# Patient Record
Sex: MALE | Race: WHITE | Employment: OTHER | URBAN - METROPOLITAN AREA
[De-identification: names, ages, dates, MRNs, and addresses within clinical notes are randomized per-mention and may not be internally consistent; named-entity substitution may affect disease eponyms.]

---

## 2017-08-15 ENCOUNTER — HOSPITAL ENCOUNTER (OUTPATIENT)
Dept: CT IMAGING | Age: 62
Discharge: HOME | End: 2017-08-15
Attending: FAMILY MEDICINE
Payer: SELF-PAY

## 2017-08-15 DIAGNOSIS — Z13.6 SCREENING FOR HEART DISEASE: ICD-10-CM

## 2017-08-15 PROCEDURE — 75571 CT HRT W/O DYE W/CA TEST: CPT

## 2017-08-15 NOTE — CARDIO/PULMONARY
Cardiac Rehab: Spoke with patient about Calcium Scoring results (320). Discussed significance of results, and CAD risk factors. Pt reported that he was being screened because PCP recommended it at annual checkup. Has been having intermittent chest pains not associated with activity. Deep substernal pain is sharp and comes on suddenly, and goes away quickly on its own. Encouraged pt to talk to MD about this discomfort. He is on meds for DM, HTN and high cholesterol. Denied smoking. Pt follows ADA diet. He does not exercise. Cautioned pt not to begin a new exercise program without being cleared by MD. Pt reported someone from his PCP office called and gave him CA score. Also told him that cardiology office would be contacting him to schedule an appt for f/u. Pt aware he needs to call 911 for persistent chest pain. Pt verbalized understanding and all questions were answered. Copy of report sent to patient, with handout on CAD.

## 2023-02-14 ENCOUNTER — HOSPITAL ENCOUNTER (EMERGENCY)
Age: 68
Discharge: HOME | End: 2023-02-14
Attending: EMERGENCY MEDICINE
Payer: MEDICARE

## 2023-02-14 ENCOUNTER — APPOINTMENT (OUTPATIENT)
Dept: GENERAL RADIOLOGY | Age: 68
End: 2023-02-14
Attending: PHYSICIAN ASSISTANT
Payer: MEDICARE

## 2023-02-14 VITALS
SYSTOLIC BLOOD PRESSURE: 134 MMHG | RESPIRATION RATE: 18 BRPM | HEIGHT: 71 IN | TEMPERATURE: 98 F | WEIGHT: 248 LBS | BODY MASS INDEX: 34.72 KG/M2 | HEART RATE: 53 BPM | DIASTOLIC BLOOD PRESSURE: 79 MMHG | OXYGEN SATURATION: 97 %

## 2023-02-14 DIAGNOSIS — S90.32XA CONTUSION OF LEFT FOOT, INITIAL ENCOUNTER: Primary | ICD-10-CM

## 2023-02-14 DIAGNOSIS — S90.812A ABRASION OF LEFT FOOT, INITIAL ENCOUNTER: ICD-10-CM

## 2023-02-14 DIAGNOSIS — S90.32XA TRAUMATIC HEMATOMA OF LEFT FOOT, INITIAL ENCOUNTER: ICD-10-CM

## 2023-02-14 DIAGNOSIS — Z23 NEED FOR TETANUS BOOSTER: ICD-10-CM

## 2023-02-14 PROCEDURE — 74011250637 HC RX REV CODE- 250/637: Performed by: PHYSICIAN ASSISTANT

## 2023-02-14 PROCEDURE — 99284 EMERGENCY DEPT VISIT MOD MDM: CPT

## 2023-02-14 PROCEDURE — 73630 X-RAY EXAM OF FOOT: CPT

## 2023-02-14 PROCEDURE — 90471 IMMUNIZATION ADMIN: CPT

## 2023-02-14 PROCEDURE — 74011000250 HC RX REV CODE- 250: Performed by: PHYSICIAN ASSISTANT

## 2023-02-14 PROCEDURE — 90714 TD VACC NO PRESV 7 YRS+ IM: CPT | Performed by: PHYSICIAN ASSISTANT

## 2023-02-14 PROCEDURE — 74011250636 HC RX REV CODE- 250/636: Performed by: PHYSICIAN ASSISTANT

## 2023-02-14 RX ORDER — IBUPROFEN 600 MG/1
600 TABLET ORAL
Status: COMPLETED | OUTPATIENT
Start: 2023-02-14 | End: 2023-02-14

## 2023-02-14 RX ORDER — BACITRACIN 500 UNIT/G
1 PACKET (EA) TOPICAL
Status: COMPLETED | OUTPATIENT
Start: 2023-02-14 | End: 2023-02-14

## 2023-02-14 RX ADMIN — CLOSTRIDIUM TETANI TOXOID ANTIGEN (FORMALDEHYDE INACTIVATED) AND CORYNEBACTERIUM DIPHTHERIAE TOXOID ANTIGEN (FORMALDEHYDE INACTIVATED) 0.5 ML: 5; 2 INJECTION, SUSPENSION INTRAMUSCULAR at 18:51

## 2023-02-14 RX ADMIN — IBUPROFEN 600 MG: 600 TABLET, FILM COATED ORAL at 18:51

## 2023-02-14 RX ADMIN — Medication 1 PACKET: at 18:51

## 2023-02-14 NOTE — ED PROVIDER NOTES
69yo male with PMH of T2DM, hypercholesterolemia, HTN presents ambulatory c/o L mid-foot pain, swelling, bruising which began 30 min PTA after he accidentally dropped his tombstone which was sitting on the counter. He was wearing tennis shoes. No bleeding, numbness/tingling or weakness. He has been ambulating with some pain. Takes no anticoagulant. Past Medical History:   Diagnosis Date    Diabetes ([facility] [address]     Hypercholesteremia     Hypertension        Past Surgical History:   Procedure Laterality Date    HX CARPAL TUNNEL RELEASE Right     HX CERVICAL FUSION  1999    C-3, C-4, C-5    HX CHOLECYSTECTOMY      HX HERNIA REPAIR Bilateral 1996    HX KNEE REPLACEMENT Right 2016    HX KNEE REPLACEMENT Left 2014    HX LUMBAR DISKECTOMY      HX MENISCUS REPAIR Right     HX VASECTOMY  1997         No family history on file. Social History     Socioeconomic History    Marital status:      Spouse name: Not on file    Number of children: Not on file    Years of education: Not on file    Highest education level: Not on file   Occupational History    Not on file   Tobacco Use    Smoking status: Never    Smokeless tobacco: Not on file   Substance and Sexual Activity    Alcohol use: No    Drug use: No    Sexual activity: Not on file   Other Topics Concern    Not on file   Social History Narrative    Not on file     Social Determinants of Health     Financial Resource Strain: Not on file   Food Insecurity: Not on file   Transportation Needs: Not on file   Physical Activity: Not on file   Stress: Not on file   Social Connections: Not on file   Intimate Partner Violence: Not on file   Housing Stability: Not on file         ALLERGIES: Patient has no known allergies. Review of Systems   Constitutional: Negative. Negative for activity change, chills, fatigue and unexpected weight change. HENT:  Negative for trouble swallowing. Respiratory:  Negative for cough, chest tightness, shortness of breath and wheezing. Cardiovascular: Negative. Negative for chest pain and palpitations. Gastrointestinal: Negative. Negative for abdominal pain, diarrhea, nausea and vomiting. Genitourinary: Negative. Negative for dysuria, flank pain, frequency and hematuria. Musculoskeletal:  Positive for arthralgias and joint swelling. Negative for back pain, neck pain and neck stiffness. Skin: Negative. Negative for color change and rash. Neurological: Negative. Negative for dizziness, numbness and headaches. All other systems reviewed and are negative. Vitals:    02/14/23 1801 02/14/23 1911   BP: (!) 140/81 134/79   Pulse: 76 (!) 53   Resp: 18 18   Temp: 97.8 °F (36.6 °C) 98 °F (36.7 °C)   SpO2: 97% 97%   Weight: 112.5 kg (248 lb)    Height: 5' 11\" (1.803 m)             Physical Exam  Vitals and nursing note reviewed. HENT:      Head: Normocephalic and atraumatic. Cardiovascular:      Pulses: Normal pulses. Pulmonary:      Effort: Pulmonary effort is normal.   Musculoskeletal:         General: Swelling and tenderness present. Normal range of motion. Cervical back: Normal range of motion. Comments: L foot- raised hematoma to midfoot with moderate swelling and superficial abrasion; no bleeding. DP and PT pulses 2+. NVI throughout. Capp refill brisk. Ankle- atraumatic, FROM. Skin:     General: Skin is warm. Neurological:      Mental Status: He is alert. Medical Decision Making    Ddx: frx, contusion, sprain, hematoma    Amount and/or Complexity of Data Reviewed  Radiology: ordered. Risk  OTC drugs. Prescription drug management. Procedures    No fracture, +hematoma with superficial abrasion. R.I.C.E, wound care discussed and will place ace wrap and post-op shoe. Does not require crutches. Is ambulating well. Orthofoot follow-up or pcp follow-up discussed and return precautions discussed.     DISCHARGE NOTE:  The patient has been re-evaluated and feeling much better and are stable for discharge. All available radiology and laboratory results have been reviewed with patient and/or available family. Patient and/or family verbally conveyed their understanding and agreement of the patient's signs, symptoms, diagnosis, treatment and prognosis and additionally agree to follow-up as recommended in the discharge instructions or to return to the Emergency Department should their condition change or worsen prior to their follow-up appointment. All questions have been answered and patient and/or available family express understanding. MEDICATIONS GIVEN:  Medications   tetanus-diphtheria toxids-td 5-2 Lf unit/0.5 mL injection 0.5 mL (0.5 mL IntraMUSCular Given 2/14/23 1851)   ibuprofen (MOTRIN) tablet 600 mg (600 mg Oral Given 2/14/23 1851)   bacitracin 500 unit/gram packet 1 Packet (1 Packet Topical Given 2/14/23 1851)       IMPRESSION:  1. Contusion of left foot, initial encounter    2. Traumatic hematoma of left foot, initial encounter    3. Need for tetanus booster    4. Abrasion of left foot, initial encounter        PLAN:  Follow-up Information       Follow up With Specialties Details Why Contact Info    Deepthi Mccullough MD [facility] Schedule an appointment as soon as possible for a visit  As needed [address] [facility]  [phone]      Josy Ascencio MD Orthopedic Surgery Schedule an appointment as soon as possible for a visit  orthopedic specialist if symptoms do not improve or worsen.  [address]   [address] [facility]  [phone]            Discharge Medication List as of 2/14/2023  7:02 PM

## 2023-02-14 NOTE — ED TRIAGE NOTES
Patient ambulatory to triage with c/o left foot pain. States 30 minutes prior to arrival a gravestone fell on his foot. States he was tilting it to look at the other side, and it slipped, landing on his foot. States pain 8/10. CHAD Arellano in triage assessing patient.

## 2023-02-15 NOTE — ED NOTES
Pt given discharge instructions, patient education, 0 prescriptions and follow up information. Pt verbalizes understanding. All questions answered. Pt discharged to home in private vehicle, ambulatory. Pt A&Ox4, RA, pain controlled.

## 2023-02-24 ENCOUNTER — APPOINTMENT (OUTPATIENT)
Dept: GENERAL RADIOLOGY | Age: 68
End: 2023-02-24
Attending: FAMILY MEDICINE
Payer: MEDICARE

## 2023-02-24 ENCOUNTER — HOSPITAL ENCOUNTER (EMERGENCY)
Age: 68
Discharge: HOME | End: 2023-02-24
Attending: STUDENT IN AN ORGANIZED HEALTH CARE EDUCATION/TRAINING PROGRAM
Payer: MEDICARE

## 2023-02-24 VITALS
DIASTOLIC BLOOD PRESSURE: 84 MMHG | TEMPERATURE: 98.4 F | SYSTOLIC BLOOD PRESSURE: 168 MMHG | OXYGEN SATURATION: 98 % | HEART RATE: 66 BPM | BODY MASS INDEX: 34.72 KG/M2 | RESPIRATION RATE: 18 BRPM | HEIGHT: 71 IN | WEIGHT: 248 LBS

## 2023-02-24 DIAGNOSIS — L03.90 CELLULITIS, UNSPECIFIED CELLULITIS SITE: Primary | ICD-10-CM

## 2023-02-24 LAB
ALBUMIN SERPL-MCNC: 4 G/DL (ref 3.5–5.2)
ALBUMIN/GLOB SERPL: 1.6 (ref 1.1–2.2)
ALP SERPL-CCNC: 67 U/L (ref 40–129)
ALT SERPL-CCNC: 20 U/L (ref 10–50)
ANION GAP SERPL CALC-SCNC: 12 MMOL/L (ref 5–15)
AST SERPL-CCNC: 18 U/L (ref 10–50)
BASOPHILS # BLD: 0.1 K/UL (ref 0–1)
BASOPHILS NFR BLD: 1 % (ref 0–1)
BILIRUB SERPL-MCNC: 1 MG/DL (ref 0.2–1)
BUN SERPL-MCNC: 14 MG/DL (ref 8–23)
BUN/CREAT SERPL: 19 (ref 12–20)
CALCIUM SERPL-MCNC: 8.8 MG/DL (ref 8.8–10.2)
CHLORIDE SERPL-SCNC: 102 MMOL/L (ref 98–107)
CO2 SERPL-SCNC: 25 MMOL/L (ref 22–29)
CREAT SERPL-MCNC: 0.73 MG/DL (ref 0.7–1.2)
CRP SERPL-MCNC: 0.46 MG/DL
DIFFERENTIAL METHOD BLD: NORMAL
EOSINOPHIL # BLD: 0.4 K/UL (ref 0–0.4)
EOSINOPHIL NFR BLD: 6 % (ref 0–7)
ERYTHROCYTE [DISTWIDTH] IN BLOOD BY AUTOMATED COUNT: 12.1 % (ref 11.5–14.5)
ERYTHROCYTE [SEDIMENTATION RATE] IN BLOOD: 9 MM/HR
GLOBULIN SER CALC-MCNC: 2.5 G/DL (ref 2–4)
GLUCOSE SERPL-MCNC: 297 MG/DL (ref 65–100)
HCT VFR BLD AUTO: 38.2 % (ref 36.6–50.3)
HGB BLD-MCNC: 13.7 G/DL (ref 12.1–17)
IMM GRANULOCYTES # BLD AUTO: 0 K/UL (ref 0–0.04)
IMM GRANULOCYTES NFR BLD AUTO: 0 % (ref 0–0.5)
LYMPHOCYTES # BLD: 2.2 K/UL (ref 0.8–3.5)
LYMPHOCYTES NFR BLD: 36 % (ref 12–49)
MCH RBC QN AUTO: 31.1 PG (ref 26–34)
MCHC RBC AUTO-ENTMCNC: 35.9 G/DL (ref 30–36.5)
MCV RBC AUTO: 86.6 FL (ref 80–99)
MONOCYTES # BLD: 0.3 K/UL (ref 0–1)
MONOCYTES NFR BLD: 6 % (ref 5–13)
NEUTS SEG # BLD: 3.1 K/UL (ref 1.8–8)
NEUTS SEG NFR BLD: 51 % (ref 32–75)
NRBC # BLD: 0 K/UL (ref 0–0.01)
NRBC BLD-RTO: 0 PER 100 WBC
PLATELET # BLD AUTO: 152 K/UL (ref 150–400)
PMV BLD AUTO: 10.2 FL (ref 8.9–12.9)
POTASSIUM SERPL-SCNC: 4 MMOL/L (ref 3.5–5.1)
PROT SERPL-MCNC: 6.5 G/DL (ref 6.4–8.3)
RBC # BLD AUTO: 4.41 M/UL (ref 4.1–5.7)
SODIUM SERPL-SCNC: 139 MMOL/L (ref 136–145)
WBC # BLD AUTO: 6.1 K/UL (ref 4.1–11.1)

## 2023-02-24 PROCEDURE — 73630 X-RAY EXAM OF FOOT: CPT

## 2023-02-24 PROCEDURE — 86140 C-REACTIVE PROTEIN: CPT

## 2023-02-24 PROCEDURE — 36415 COLL VENOUS BLD VENIPUNCTURE: CPT

## 2023-02-24 PROCEDURE — 99284 EMERGENCY DEPT VISIT MOD MDM: CPT

## 2023-02-24 PROCEDURE — 85025 COMPLETE CBC W/AUTO DIFF WBC: CPT

## 2023-02-24 PROCEDURE — 80053 COMPREHEN METABOLIC PANEL: CPT

## 2023-02-24 PROCEDURE — 85652 RBC SED RATE AUTOMATED: CPT

## 2023-02-24 RX ORDER — DOXYCYCLINE HYCLATE 100 MG
100 TABLET ORAL 2 TIMES DAILY
Qty: 14 TABLET | Refills: 0 | Status: SHIPPED | OUTPATIENT
Start: 2023-02-24 | End: 2023-03-03

## 2023-02-24 NOTE — DISCHARGE INSTRUCTIONS
Thank you for allowing us to provide you with medical care today. We realize that you have many choices for your emergency care needs. We thank you for choosing [facility]. Please choose us in the future for any continued health care needs. The exam and treatment you received in the emergency department were for an emergent problem and are not intended as complete care. It is important that you follow-up with a doctor. If your symptoms worsen or you do not improve you should return to the emergency department. We are available 24 hours a day. Please make an appointment with your health care provider for follow-up of your emergency department visit. Take this sheet with you when you go to your follow-up visit.

## 2023-02-24 NOTE — ED PROVIDER NOTES
Patient is a 49-year-old male with past medical history of type 2 diabetes, hypertension, hyperlipidemia presenting for evaluation of foot injury. Was seen here on 2/16 when a heavy tombstone weighing approximately 300 pounds fell on his foot. He had a normal x-ray at that time. He has been Ace wrapping his foot and using a postoperative shoe with great relief of symptoms. His pain has been well controlled. Yesterday evening, he noticed that his foot seemed slightly more swollen and erythematous in nature. He was worried that he could be developing an infection. He was unable to get into his PCP for another week or so. He denies any systemic symptoms of infection such as fever or chills. No other complaints. Past Medical History:   Diagnosis Date    Diabetes ([facility] [address]     Hypercholesteremia     Hypertension        Past Surgical History:   Procedure Laterality Date    HX CARPAL TUNNEL RELEASE Right     HX CERVICAL FUSION  1999    C-3, C-4, C-5    HX CHOLECYSTECTOMY      HX HERNIA REPAIR Bilateral 1996    HX KNEE REPLACEMENT Right 2016    HX KNEE REPLACEMENT Left 2014    HX LUMBAR DISKECTOMY      HX MENISCUS REPAIR Right     HX VASECTOMY  1997         No family history on file.     Social History     Socioeconomic History    Marital status:      Spouse name: Not on file    Number of children: Not on file    Years of education: Not on file    Highest education level: Not on file   Occupational History    Not on file   Tobacco Use    Smoking status: Never    Smokeless tobacco: Not on file   Substance and Sexual Activity    Alcohol use: No    Drug use: No    Sexual activity: Not on file   Other Topics Concern    Not on file   Social History Narrative    Not on file     Social Determinants of Health     Financial Resource Strain: Not on file   Food Insecurity: Not on file   Transportation Needs: Not on file   Physical Activity: Not on file   Stress: Not on file   Social Connections: Not on file Intimate Partner Violence: Not on file   Housing Stability: Not on file         ALLERGIES: Patient has no known allergies. Review of Systems   Constitutional:  Negative for fever and unexpected weight change. HENT:  Negative for congestion. Eyes:  Negative for visual disturbance. Respiratory:  Negative for cough, chest tightness and shortness of breath. Cardiovascular:  Negative for chest pain and palpitations. Gastrointestinal:  Negative for abdominal pain, diarrhea, nausea and vomiting. Endocrine: Negative for polyuria. Genitourinary:  Negative for dysuria and flank pain. Musculoskeletal:  Positive for arthralgias and joint swelling. Negative for back pain. Skin:  Positive for rash. Negative for color change. Allergic/Immunologic: Negative for immunocompromised state. Neurological:  Negative for dizziness and headaches. Hematological:  Negative for adenopathy. Psychiatric/Behavioral:  Negative for agitation. Vitals:    02/24/23 1330   BP: (!) 168/84   Pulse: 66   Resp: 18   Temp: 98.4 °F (36.9 °C)   SpO2: 98%   Weight: 112.5 kg (248 lb)   Height: 5' 11\" (1.803 m)            Physical Exam  Vitals and nursing note reviewed. Constitutional:       General: He is not in acute distress. Appearance: Normal appearance. He is normal weight. HENT:      Head: Atraumatic. Eyes:      Conjunctiva/sclera: Conjunctivae normal.      Pupils: Pupils are equal, round, and reactive to light. Cardiovascular:      Rate and Rhythm: Normal rate. Pulmonary:      Effort: Pulmonary effort is normal. No respiratory distress. Abdominal:      General: Abdomen is flat. Musculoskeletal:         General: Normal range of motion. Cervical back: Neck supple. Comments: Left foot soft tissue swelling present. Normal DP pulse. Area of erythema and heat present. Skin:     General: Skin is warm and dry. Capillary Refill: Capillary refill takes less than 2 seconds.    Neurological: General: No focal deficit present. Mental Status: He is alert and oriented to person, place, and time. Mental status is at baseline. Psychiatric:         Mood and Affect: Mood normal.         Behavior: Behavior normal.        Medical Decision Making  Presenting with left foot erythema, heat, soft tissue swelling. Injury on 2/16. Physical exam concerning for development of cellulitis. Plan to obtain basic labs including CBC, inflammatory markers. We will repeat x-ray of foot. 1505 -Labs without elevation in inflammatory markers or WBC. X-ray of foot interpreted by myself and radiologist with no bony abnormality. Advised patient to continue concussion wrap and postoperative shoe until he follows up with orthopedics and/or his primary care. Will cover patient for possible developing cellulitic infection with doxycycline. Discussed strict return precautions. Discussed my clinical impression(s), any labs and/or radiology results with the patient. I answered any questions and addressed any concerns. Discussed the importance of following up with their primary care physician and/or specialist(s). Discussed signs or symptoms that would warrant return back to the ER for further evaluation. The patient is agreeable with discharge. Amount and/or Complexity of Data Reviewed  Labs: ordered. Decision-making details documented in ED Course. Radiology: ordered and independent interpretation performed. Decision-making details documented in ED Course. Risk  Prescription drug management.            Procedures

## 2023-02-24 NOTE — ED TRIAGE NOTES
Pt arrives ambulatory w/ post op shoe to L foot w/ c/c of L foot swelling/skin changes after 300lb chelsi fell on it 2/14/23. Pt was seen in ED the same day & reports he did not have any fx. Pt reports blistering to skin & erythema. Pt has ACE wrap & post op shoe in place.

## 2023-05-03 ENCOUNTER — APPOINTMENT (OUTPATIENT)
Dept: CT IMAGING | Age: 68
End: 2023-05-03
Attending: STUDENT IN AN ORGANIZED HEALTH CARE EDUCATION/TRAINING PROGRAM
Payer: MEDICARE

## 2023-05-03 ENCOUNTER — HOSPITAL ENCOUNTER (EMERGENCY)
Age: 68
Discharge: HOME | End: 2023-05-03
Attending: EMERGENCY MEDICINE
Payer: MEDICARE

## 2023-05-03 VITALS
SYSTOLIC BLOOD PRESSURE: 143 MMHG | OXYGEN SATURATION: 98 % | WEIGHT: 230 LBS | BODY MASS INDEX: 32.2 KG/M2 | DIASTOLIC BLOOD PRESSURE: 72 MMHG | TEMPERATURE: 98 F | HEART RATE: 65 BPM | RESPIRATION RATE: 15 BRPM | HEIGHT: 71 IN

## 2023-05-03 DIAGNOSIS — M54.2 NECK PAIN: Primary | ICD-10-CM

## 2023-05-03 PROCEDURE — 72125 CT NECK SPINE W/O DYE: CPT

## 2023-05-03 PROCEDURE — 74011000250 HC RX REV CODE- 250: Performed by: STUDENT IN AN ORGANIZED HEALTH CARE EDUCATION/TRAINING PROGRAM

## 2023-05-03 PROCEDURE — 74011250637 HC RX REV CODE- 250/637: Performed by: STUDENT IN AN ORGANIZED HEALTH CARE EDUCATION/TRAINING PROGRAM

## 2023-05-03 PROCEDURE — 99284 EMERGENCY DEPT VISIT MOD MDM: CPT

## 2023-05-03 RX ORDER — ACETAMINOPHEN 500 MG
1000 TABLET ORAL
Qty: 40 TABLET | Refills: 0 | Status: SHIPPED | OUTPATIENT
Start: 2023-05-03

## 2023-05-03 RX ORDER — METHOCARBAMOL 500 MG/1
500 TABLET, FILM COATED ORAL ONCE
Status: COMPLETED | OUTPATIENT
Start: 2023-05-03 | End: 2023-05-03

## 2023-05-03 RX ORDER — ACETAMINOPHEN 500 MG
1000 TABLET ORAL
Status: COMPLETED | OUTPATIENT
Start: 2023-05-03 | End: 2023-05-03

## 2023-05-03 RX ORDER — LIDOCAINE 50 MG/G
1 PATCH TOPICAL EVERY 24 HOURS
Qty: 15 EACH | Refills: 0 | Status: SHIPPED | OUTPATIENT
Start: 2023-05-03

## 2023-05-03 RX ORDER — LIDOCAINE 4 G/100G
1 PATCH TOPICAL ONCE
Status: DISCONTINUED | OUTPATIENT
Start: 2023-05-03 | End: 2023-05-04 | Stop reason: HOSPADM

## 2023-05-03 RX ORDER — METHOCARBAMOL 750 MG/1
750 TABLET, FILM COATED ORAL
Qty: 20 TABLET | Refills: 0 | Status: SHIPPED | OUTPATIENT
Start: 2023-05-03

## 2023-05-03 RX ADMIN — ACETAMINOPHEN 1000 MG: 500 TABLET ORAL at 21:12

## 2023-05-03 RX ADMIN — METHOCARBAMOL TABLETS 500 MG: 500 TABLET, COATED ORAL at 21:12

## 2024-05-31 ENCOUNTER — HOSPITAL ENCOUNTER (OUTPATIENT)
Facility: HOSPITAL | Age: 69
End: 2024-05-31

## 2024-05-31 ENCOUNTER — CLINICAL DOCUMENTATION (OUTPATIENT)
Facility: HOSPITAL | Age: 69
End: 2024-05-31

## 2024-05-31 VITALS
BODY MASS INDEX: 33.6 KG/M2 | OXYGEN SATURATION: 99 % | DIASTOLIC BLOOD PRESSURE: 83 MMHG | WEIGHT: 240 LBS | SYSTOLIC BLOOD PRESSURE: 161 MMHG | HEART RATE: 58 BPM | HEIGHT: 71 IN | RESPIRATION RATE: 16 BRPM

## 2024-05-31 DIAGNOSIS — C61 MALIGNANT NEOPLASM OF PROSTATE (HCC): Primary | ICD-10-CM

## 2024-05-31 RX ORDER — ASPIRIN 81 MG/1
81 TABLET, CHEWABLE ORAL DAILY
COMMUNITY

## 2024-05-31 RX ORDER — GLIPIZIDE 5 MG/1
TABLET ORAL
COMMUNITY
Start: 2024-05-28

## 2024-05-31 RX ORDER — ATORVASTATIN CALCIUM 20 MG/1
20 TABLET, FILM COATED ORAL
COMMUNITY
Start: 2014-03-20

## 2024-05-31 RX ORDER — ALFUZOSIN HYDROCHLORIDE 10 MG/1
10 TABLET, EXTENDED RELEASE ORAL
COMMUNITY
Start: 2024-04-13

## 2024-05-31 RX ORDER — AMLODIPINE BESYLATE 5 MG/1
TABLET ORAL
COMMUNITY
Start: 2020-10-08

## 2024-05-31 ASSESSMENT — PAIN SCALES - GENERAL: PAINLEVEL_OUTOF10: 0

## 2024-05-31 NOTE — PROGRESS NOTES
Cancer [place] at [facility]  [facility]      RADIATION ONCOLOGY INITIAL CONSULTATION NOTE      Encounter Date: 05/31/24  Patient Name: Cezar Henley  YOB: 1955  Medical Record Number: [id number]  Referring Physician: Dany Liao MD  [address] [place] [address]  Primary Care Provider: Taina Phelan MD      DIAGNOSIS:       ICD-10-CM    1. Malignant neoplasm of prostate (HCC)  C61         STAGING:    Cancer Staging   Malignant neoplasm of prostate (HCC)  Staging form: Prostate, AJCC 8th Edition  - Clinical stage from 4/8/2024: Stage IIIB (cT3b, cN0, cM0, PSA: 4.9, Grade Group: 3) - Signed by Erwin Nix MD on 5/31/2024  AJCC Staging has been reviewed      CHIEF COMPLAINT:   Prostate adenocarcinoma      ASSESSMENT:   69 yo M with high risk prostate cancer, cT1c but rT3bN0, iPSA 4.88, [place] 4+3=7 in 1 core (4/12 total cores involved).      The patient has high risk prostate cancer given his radiographic left SV involvement.   I reviewed the natural history of high risk prostate cancer and the therapeutic options available including radiation therapy and surgery. The patient also had a discussion regarding treatment with Dr. Liao (Urology). Both radiation and surgery serve as first-line options. However, for a patient with high risk prostate cancer pursuing surgery, there is a possibility that he may have positive surgical margins or pT3 disease, and therefore have indications for adjuvant radiotherapy.     Also discussed the [facility] nomogram which showed his risk for organ confined disease is 17%, EPE is 81%, SVI 17%, and charlie involvement 22%.  Staging PSMA PET/CT showed focal uptake with two locations in the prostate as well as uptake into the left SV.  No uptake in regional nodes or distantly.  However, given the SV uptake, I think it is worth classifying him as high risk rather than unfavorable intermediate risk (based on [place]

## 2024-06-03 NOTE — PROGRESS NOTES
NCCN Distress Thermometer    Date Screening Completed: 5/31/24    Screening Declined:  [] Yes    Number that best describes how much distress you've experienced in the past week, including today?  0 [] - No distress 1 []      2 []      3 []      4 [x]       5 []       6 []      7 []      8 []      9 []       10 [] - Extreme distress    PROBLEM LIST  Have you had concerns about any of the items below in the past week, including today?      Physical Concerns Practical Concerns   [] Pain [] Taking care of myself    [x] Sleep [] Taking care of others    [x] Fatigue [] Work   [] Tobacco use  [] School   [] Substance use  [] Housing   [] Memory or concentration [x] Finances   [] Sexual health [] Insurance   [] Changes in eating  [] Transportation   [] Loss or change of physical abilities  []     [] Having enough food   Emotional Concerns [] Access to medicine   [x] Worry or anxiety [x] Treatment decisions   [x] Sadness or depression    [x] Loss of interest or enjoyment  Spiritual or Confucianist Concerns   [x] Grief or loss  [] Sense of meaning or purpose   [] Fear [] Changes in robyn or beliefs   [] Loneliness  [] Death, dying, or afterlife   [] Anger [] Conflict between beliefs and cancer treatments    [] Changes in appearance [] Relationship with the sacred   [] Feelings of worthlessness or being a burden [] Ritual or dietary needs        Social Concerns     [] Relationship with spouse or partner     [] Relationship with children    [] Relationship with family members     [] Relationship with friends or coworkers     [] Communication with health care team     [] Ability to have children     [] Prejudice or discrimination        Other Concerns:     Patient received resource information and education:  [] Yes  [x] No

## 2024-06-11 ENCOUNTER — HOSPITAL ENCOUNTER (OUTPATIENT)
Facility: HOSPITAL | Age: 69
Discharge: HOME | End: 2024-06-14
Attending: RADIOLOGY

## 2024-07-02 ENCOUNTER — HOSPITAL ENCOUNTER (OUTPATIENT)
Facility: HOSPITAL | Age: 69
Discharge: HOME | End: 2024-07-05
Attending: RADIOLOGY

## 2024-07-02 LAB
RAD ONC ARIA COURSE FIRST TREATMENT DATE: NORMAL
RAD ONC ARIA COURSE ID: NORMAL
RAD ONC ARIA COURSE INTENT: NORMAL
RAD ONC ARIA COURSE LAST TREATMENT DATE: NORMAL
RAD ONC ARIA COURSE SESSION NUMBER: 1
RAD ONC ARIA COURSE START DATE: NORMAL
RAD ONC ARIA COURSE TREATMENT ELAPSED DAYS: 0
RAD ONC ARIA PLAN FRACTIONS TREATED TO DATE: 1
RAD ONC ARIA PLAN ID: NORMAL
RAD ONC ARIA PLAN PRESCRIBED DOSE PER FRACTION: 2.7 GY
RAD ONC ARIA PLAN PRIMARY REFERENCE POINT: NORMAL
RAD ONC ARIA PLAN TOTAL FRACTIONS PRESCRIBED: 26
RAD ONC ARIA PLAN TOTAL PRESCRIBED DOSE: 7020 CGY
RAD ONC ARIA REFERENCE POINT DOSAGE GIVEN TO DATE: 2 GY
RAD ONC ARIA REFERENCE POINT DOSAGE GIVEN TO DATE: 2.7 GY
RAD ONC ARIA REFERENCE POINT DOSAGE GIVEN TO DATE: 2.76 GY
RAD ONC ARIA REFERENCE POINT ID: NORMAL
RAD ONC ARIA REFERENCE POINT SESSION DOSAGE GIVEN: 2 GY
RAD ONC ARIA REFERENCE POINT SESSION DOSAGE GIVEN: 2.7 GY
RAD ONC ARIA REFERENCE POINT SESSION DOSAGE GIVEN: 2.76 GY

## 2024-07-03 ENCOUNTER — HOSPITAL ENCOUNTER (OUTPATIENT)
Facility: HOSPITAL | Age: 69
Discharge: HOME | End: 2024-07-06
Attending: RADIOLOGY

## 2024-07-03 LAB
RAD ONC ARIA COURSE FIRST TREATMENT DATE: NORMAL
RAD ONC ARIA COURSE ID: NORMAL
RAD ONC ARIA COURSE INTENT: NORMAL
RAD ONC ARIA COURSE LAST TREATMENT DATE: NORMAL
RAD ONC ARIA COURSE SESSION NUMBER: 2
RAD ONC ARIA COURSE START DATE: NORMAL
RAD ONC ARIA COURSE TREATMENT ELAPSED DAYS: 1
RAD ONC ARIA PLAN FRACTIONS TREATED TO DATE: 2
RAD ONC ARIA PLAN ID: NORMAL
RAD ONC ARIA PLAN PRESCRIBED DOSE PER FRACTION: 2.7 GY
RAD ONC ARIA PLAN PRIMARY REFERENCE POINT: NORMAL
RAD ONC ARIA PLAN TOTAL FRACTIONS PRESCRIBED: 26
RAD ONC ARIA PLAN TOTAL PRESCRIBED DOSE: 7020 CGY
RAD ONC ARIA REFERENCE POINT DOSAGE GIVEN TO DATE: 4 GY
RAD ONC ARIA REFERENCE POINT DOSAGE GIVEN TO DATE: 5.4 GY
RAD ONC ARIA REFERENCE POINT DOSAGE GIVEN TO DATE: 5.51 GY
RAD ONC ARIA REFERENCE POINT ID: NORMAL
RAD ONC ARIA REFERENCE POINT SESSION DOSAGE GIVEN: 2 GY
RAD ONC ARIA REFERENCE POINT SESSION DOSAGE GIVEN: 2.7 GY
RAD ONC ARIA REFERENCE POINT SESSION DOSAGE GIVEN: 2.76 GY

## 2024-07-05 ENCOUNTER — HOSPITAL ENCOUNTER (OUTPATIENT)
Facility: HOSPITAL | Age: 69
Discharge: HOME | End: 2024-07-08
Attending: RADIOLOGY

## 2024-07-05 LAB
RAD ONC ARIA COURSE FIRST TREATMENT DATE: NORMAL
RAD ONC ARIA COURSE ID: NORMAL
RAD ONC ARIA COURSE INTENT: NORMAL
RAD ONC ARIA COURSE LAST TREATMENT DATE: NORMAL
RAD ONC ARIA COURSE SESSION NUMBER: 3
RAD ONC ARIA COURSE START DATE: NORMAL
RAD ONC ARIA COURSE TREATMENT ELAPSED DAYS: 3
RAD ONC ARIA PLAN FRACTIONS TREATED TO DATE: 3
RAD ONC ARIA PLAN ID: NORMAL
RAD ONC ARIA PLAN PRESCRIBED DOSE PER FRACTION: 2.7 GY
RAD ONC ARIA PLAN PRIMARY REFERENCE POINT: NORMAL
RAD ONC ARIA PLAN TOTAL FRACTIONS PRESCRIBED: 26
RAD ONC ARIA PLAN TOTAL PRESCRIBED DOSE: 7020 CGY
RAD ONC ARIA REFERENCE POINT DOSAGE GIVEN TO DATE: 6 GY
RAD ONC ARIA REFERENCE POINT DOSAGE GIVEN TO DATE: 8.1 GY
RAD ONC ARIA REFERENCE POINT DOSAGE GIVEN TO DATE: 8.27 GY
RAD ONC ARIA REFERENCE POINT ID: NORMAL
RAD ONC ARIA REFERENCE POINT SESSION DOSAGE GIVEN: 2 GY
RAD ONC ARIA REFERENCE POINT SESSION DOSAGE GIVEN: 2.7 GY
RAD ONC ARIA REFERENCE POINT SESSION DOSAGE GIVEN: 2.76 GY

## 2024-07-09 ENCOUNTER — HOSPITAL ENCOUNTER (OUTPATIENT)
Facility: HOSPITAL | Age: 69
Discharge: HOME | End: 2024-07-12
Attending: RADIOLOGY

## 2024-07-09 LAB
RAD ONC ARIA COURSE FIRST TREATMENT DATE: NORMAL
RAD ONC ARIA COURSE ID: NORMAL
RAD ONC ARIA COURSE INTENT: NORMAL
RAD ONC ARIA COURSE LAST TREATMENT DATE: NORMAL
RAD ONC ARIA COURSE SESSION NUMBER: 4
RAD ONC ARIA COURSE START DATE: NORMAL
RAD ONC ARIA COURSE TREATMENT ELAPSED DAYS: 7
RAD ONC ARIA PLAN FRACTIONS TREATED TO DATE: 4
RAD ONC ARIA PLAN ID: NORMAL
RAD ONC ARIA PLAN PRESCRIBED DOSE PER FRACTION: 2.7 GY
RAD ONC ARIA PLAN PRIMARY REFERENCE POINT: NORMAL
RAD ONC ARIA PLAN TOTAL FRACTIONS PRESCRIBED: 26
RAD ONC ARIA PLAN TOTAL PRESCRIBED DOSE: 7020 CGY
RAD ONC ARIA REFERENCE POINT DOSAGE GIVEN TO DATE: 10.8 GY
RAD ONC ARIA REFERENCE POINT DOSAGE GIVEN TO DATE: 11.03 GY
RAD ONC ARIA REFERENCE POINT DOSAGE GIVEN TO DATE: 8 GY
RAD ONC ARIA REFERENCE POINT ID: NORMAL
RAD ONC ARIA REFERENCE POINT SESSION DOSAGE GIVEN: 2 GY
RAD ONC ARIA REFERENCE POINT SESSION DOSAGE GIVEN: 2.7 GY
RAD ONC ARIA REFERENCE POINT SESSION DOSAGE GIVEN: 2.76 GY

## 2024-07-10 ENCOUNTER — HOSPITAL ENCOUNTER (OUTPATIENT)
Facility: HOSPITAL | Age: 69
Discharge: HOME | End: 2024-07-13
Attending: RADIOLOGY

## 2024-07-10 DIAGNOSIS — C61 MALIGNANT NEOPLASM OF PROSTATE (HCC): Primary | ICD-10-CM

## 2024-07-10 LAB
RAD ONC ARIA COURSE FIRST TREATMENT DATE: NORMAL
RAD ONC ARIA COURSE ID: NORMAL
RAD ONC ARIA COURSE INTENT: NORMAL
RAD ONC ARIA COURSE LAST TREATMENT DATE: NORMAL
RAD ONC ARIA COURSE SESSION NUMBER: 5
RAD ONC ARIA COURSE START DATE: NORMAL
RAD ONC ARIA COURSE TREATMENT ELAPSED DAYS: 8
RAD ONC ARIA PLAN FRACTIONS TREATED TO DATE: 5
RAD ONC ARIA PLAN ID: NORMAL
RAD ONC ARIA PLAN PRESCRIBED DOSE PER FRACTION: 2.7 GY
RAD ONC ARIA PLAN PRIMARY REFERENCE POINT: NORMAL
RAD ONC ARIA PLAN TOTAL FRACTIONS PRESCRIBED: 26
RAD ONC ARIA PLAN TOTAL PRESCRIBED DOSE: 7020 CGY
RAD ONC ARIA REFERENCE POINT DOSAGE GIVEN TO DATE: 10 GY
RAD ONC ARIA REFERENCE POINT DOSAGE GIVEN TO DATE: 13.5 GY
RAD ONC ARIA REFERENCE POINT DOSAGE GIVEN TO DATE: 13.78 GY
RAD ONC ARIA REFERENCE POINT ID: NORMAL
RAD ONC ARIA REFERENCE POINT SESSION DOSAGE GIVEN: 2 GY
RAD ONC ARIA REFERENCE POINT SESSION DOSAGE GIVEN: 2.7 GY
RAD ONC ARIA REFERENCE POINT SESSION DOSAGE GIVEN: 2.76 GY

## 2024-07-10 RX ORDER — FLUOXETINE HYDROCHLORIDE 20 MG/1
CAPSULE ORAL
COMMUNITY
Start: 2024-06-17

## 2024-07-10 ASSESSMENT — PATIENT HEALTH QUESTIONNAIRE - PHQ9
2. FEELING DOWN, DEPRESSED OR HOPELESS: SEVERAL DAYS
SUM OF ALL RESPONSES TO PHQ9 QUESTIONS 1 & 2: 1
SUM OF ALL RESPONSES TO PHQ QUESTIONS 1-9: 1
SUM OF ALL RESPONSES TO PHQ QUESTIONS 1-9: 1
1. LITTLE INTEREST OR PLEASURE IN DOING THINGS: NOT AT ALL
SUM OF ALL RESPONSES TO PHQ QUESTIONS 1-9: 1
SUM OF ALL RESPONSES TO PHQ QUESTIONS 1-9: 1

## 2024-07-10 ASSESSMENT — PAIN SCALES - GENERAL: PAINLEVEL_OUTOF10: 0

## 2024-07-10 NOTE — PROGRESS NOTES
(GLUCOPHAGE) 500 mg, Oral, 2 TIMES DAILY WITH MEALS    zolpidem (AMBIEN) 5 mg, NIGHTLY PRN        Physical Exam   Vitals: Pain Level: 0  Performance Status: ECOG 0, fully active, able to carry on all predisease activities without restrictions  Constitutional: Pleasant, sitting comfortably in no acute distress.  Respiratory: Non-labored breathing  Neurologic: Alert and oriented.  Psychiatric: Cooperative to commands and responds to questions appropriately.    Assessment & Plan   - Continue radiation treatment as planned  - Treatment setup and plan reviewed. Port images/CBCT images reviewed. Appropriate laboratory work was reviewed.   - Treatment side effects and toxicities reviewed with the patient, and appropriate management was advised as detailed above.     Jony Wakefield MD  [facility]  [facility]  [id number] [place] [id number]  P: [phone]  [facility] [address], [place] [address]  P: [phone]  [facility]  [address] [place] [address], [address], [place] [address]  P: [phone]

## 2024-07-11 ENCOUNTER — HOSPITAL ENCOUNTER (OUTPATIENT)
Facility: HOSPITAL | Age: 69
Discharge: HOME | End: 2024-07-14
Attending: RADIOLOGY

## 2024-07-11 LAB
RAD ONC ARIA COURSE FIRST TREATMENT DATE: NORMAL
RAD ONC ARIA COURSE ID: NORMAL
RAD ONC ARIA COURSE INTENT: NORMAL
RAD ONC ARIA COURSE LAST TREATMENT DATE: NORMAL
RAD ONC ARIA COURSE SESSION NUMBER: 6
RAD ONC ARIA COURSE START DATE: NORMAL
RAD ONC ARIA COURSE TREATMENT ELAPSED DAYS: 9
RAD ONC ARIA PLAN FRACTIONS TREATED TO DATE: 6
RAD ONC ARIA PLAN ID: NORMAL
RAD ONC ARIA PLAN PRESCRIBED DOSE PER FRACTION: 2.7 GY
RAD ONC ARIA PLAN PRIMARY REFERENCE POINT: NORMAL
RAD ONC ARIA PLAN TOTAL FRACTIONS PRESCRIBED: 26
RAD ONC ARIA PLAN TOTAL PRESCRIBED DOSE: 7020 CGY
RAD ONC ARIA REFERENCE POINT DOSAGE GIVEN TO DATE: 12 GY
RAD ONC ARIA REFERENCE POINT DOSAGE GIVEN TO DATE: 16.2 GY
RAD ONC ARIA REFERENCE POINT DOSAGE GIVEN TO DATE: 16.54 GY
RAD ONC ARIA REFERENCE POINT ID: NORMAL
RAD ONC ARIA REFERENCE POINT SESSION DOSAGE GIVEN: 2 GY
RAD ONC ARIA REFERENCE POINT SESSION DOSAGE GIVEN: 2.7 GY
RAD ONC ARIA REFERENCE POINT SESSION DOSAGE GIVEN: 2.76 GY

## 2024-07-12 ENCOUNTER — HOSPITAL ENCOUNTER (OUTPATIENT)
Facility: HOSPITAL | Age: 69
Discharge: HOME | End: 2024-07-15
Attending: RADIOLOGY

## 2024-07-12 LAB
RAD ONC ARIA COURSE FIRST TREATMENT DATE: NORMAL
RAD ONC ARIA COURSE ID: NORMAL
RAD ONC ARIA COURSE INTENT: NORMAL
RAD ONC ARIA COURSE LAST TREATMENT DATE: NORMAL
RAD ONC ARIA COURSE SESSION NUMBER: 7
RAD ONC ARIA COURSE START DATE: NORMAL
RAD ONC ARIA COURSE TREATMENT ELAPSED DAYS: 10
RAD ONC ARIA PLAN FRACTIONS TREATED TO DATE: 7
RAD ONC ARIA PLAN ID: NORMAL
RAD ONC ARIA PLAN PRESCRIBED DOSE PER FRACTION: 2.7 GY
RAD ONC ARIA PLAN PRIMARY REFERENCE POINT: NORMAL
RAD ONC ARIA PLAN TOTAL FRACTIONS PRESCRIBED: 26
RAD ONC ARIA PLAN TOTAL PRESCRIBED DOSE: 7020 CGY
RAD ONC ARIA REFERENCE POINT DOSAGE GIVEN TO DATE: 14 GY
RAD ONC ARIA REFERENCE POINT DOSAGE GIVEN TO DATE: 18.9 GY
RAD ONC ARIA REFERENCE POINT DOSAGE GIVEN TO DATE: 19.3 GY
RAD ONC ARIA REFERENCE POINT ID: NORMAL
RAD ONC ARIA REFERENCE POINT SESSION DOSAGE GIVEN: 2 GY
RAD ONC ARIA REFERENCE POINT SESSION DOSAGE GIVEN: 2.7 GY
RAD ONC ARIA REFERENCE POINT SESSION DOSAGE GIVEN: 2.76 GY

## 2024-07-15 ENCOUNTER — HOSPITAL ENCOUNTER (OUTPATIENT)
Facility: HOSPITAL | Age: 69
Discharge: HOME | End: 2024-07-18
Attending: RADIOLOGY

## 2024-07-15 DIAGNOSIS — C61 MALIGNANT NEOPLASM OF PROSTATE (HCC): Primary | ICD-10-CM

## 2024-07-15 LAB
RAD ONC ARIA COURSE FIRST TREATMENT DATE: NORMAL
RAD ONC ARIA COURSE ID: NORMAL
RAD ONC ARIA COURSE INTENT: NORMAL
RAD ONC ARIA COURSE LAST TREATMENT DATE: NORMAL
RAD ONC ARIA COURSE SESSION NUMBER: 8
RAD ONC ARIA COURSE START DATE: NORMAL
RAD ONC ARIA COURSE TREATMENT ELAPSED DAYS: 13
RAD ONC ARIA PLAN FRACTIONS TREATED TO DATE: 8
RAD ONC ARIA PLAN ID: NORMAL
RAD ONC ARIA PLAN PRESCRIBED DOSE PER FRACTION: 2.7 GY
RAD ONC ARIA PLAN PRIMARY REFERENCE POINT: NORMAL
RAD ONC ARIA PLAN TOTAL FRACTIONS PRESCRIBED: 26
RAD ONC ARIA PLAN TOTAL PRESCRIBED DOSE: 7020 CGY
RAD ONC ARIA REFERENCE POINT DOSAGE GIVEN TO DATE: 16 GY
RAD ONC ARIA REFERENCE POINT DOSAGE GIVEN TO DATE: 21.6 GY
RAD ONC ARIA REFERENCE POINT DOSAGE GIVEN TO DATE: 22.05 GY
RAD ONC ARIA REFERENCE POINT ID: NORMAL
RAD ONC ARIA REFERENCE POINT SESSION DOSAGE GIVEN: 2 GY
RAD ONC ARIA REFERENCE POINT SESSION DOSAGE GIVEN: 2.7 GY
RAD ONC ARIA REFERENCE POINT SESSION DOSAGE GIVEN: 2.76 GY

## 2024-07-15 RX ORDER — LOPERAMIDE HYDROCHLORIDE 2 MG/1
2 CAPSULE ORAL DAILY
COMMUNITY

## 2024-07-15 NOTE — PROGRESS NOTES
Cancer [place] at [facility]  [facility]    Radiation Oncology Weekly Progress Note  Encounter Date: 07/15/24    Cezar Henley  [id number]  1955     Diagnosis       ICD-10-CM    1. Malignant neoplasm of prostate (HCC)  C61          Cancer Staging   Malignant neoplasm of prostate (HCC)  Staging form: Prostate, AJCC 8th Edition  - Clinical stage from 4/8/2024: Stage IIIB (cT3b, cN0, cM0, PSA: 4.9, Grade Group: 3) - Signed by Erwin Nix MD on 5/31/2024    AJCC Staging has been reviewed.  Oncologic History   67 yo M with high risk prostate cancer, cT1c but rT3bN0, iPSA 4.88, [place] 4+3=7 in 1 core (4/12 total cores involved).     Interval History   Mr. Henley was seen today for his weekly on-treatment evaluation    07/10/2024: at fraction 5, first OTV, noting some softer stools, more urinary urgency and frequency since starting. He is currently on alfuzosin. Reviewed treatment expectations and logistics.  7/15/24: Add fraction 8 with increased  frequency, nocturia, and incomplete emptying.  On alfuzosin once daily.  Recommend increasing to twice daily dosing.  Was having increased GI frequency but started Imodium preemptively which has helped.  No dysuria.  Grade 1 fatigue/hot flashes from ADT.    Treatment Details   Treatment Site: Prostate, SV, Pelvic Nodes  Treatment Technique: IMRT/VMAT  Treatment Site Dose/Fx (cGy) #Fx Delivered Dose (cGy) Total Planned Dose (cGy) Start Date End Date   Prostate, SV  Pelvic Nodes 270  200 8/26 2160  1600 7020  5200 07/02/24 07/15/24     Concurrent Therapy: Concurrent ADT: firmagon 240mg 6/20/24  Response to treatment: N/A    Allergies and Medications   No Known Allergies    Current Outpatient Medications   Medication Instructions    alfuzosin (UROXATRAL) 10 mg, Oral, EVERY BEDTIME    amLODIPine (NORVASC) 5 MG tablet 1 tablet Orally Once a day for 1    aspirin 325 mg, 2 TIMES DAILY    aspirin 81 mg, DAILY    atorvastatin (LIPITOR)

## 2024-07-16 ENCOUNTER — HOSPITAL ENCOUNTER (OUTPATIENT)
Facility: HOSPITAL | Age: 69
Discharge: HOME | End: 2024-07-19
Attending: RADIOLOGY

## 2024-07-16 LAB
RAD ONC ARIA COURSE FIRST TREATMENT DATE: NORMAL
RAD ONC ARIA COURSE ID: NORMAL
RAD ONC ARIA COURSE INTENT: NORMAL
RAD ONC ARIA COURSE LAST TREATMENT DATE: NORMAL
RAD ONC ARIA COURSE SESSION NUMBER: 9
RAD ONC ARIA COURSE START DATE: NORMAL
RAD ONC ARIA COURSE TREATMENT ELAPSED DAYS: 14
RAD ONC ARIA PLAN FRACTIONS TREATED TO DATE: 9
RAD ONC ARIA PLAN ID: NORMAL
RAD ONC ARIA PLAN PRESCRIBED DOSE PER FRACTION: 2.7 GY
RAD ONC ARIA PLAN PRIMARY REFERENCE POINT: NORMAL
RAD ONC ARIA PLAN TOTAL FRACTIONS PRESCRIBED: 26
RAD ONC ARIA PLAN TOTAL PRESCRIBED DOSE: 7020 CGY
RAD ONC ARIA REFERENCE POINT DOSAGE GIVEN TO DATE: 18 GY
RAD ONC ARIA REFERENCE POINT DOSAGE GIVEN TO DATE: 24.3 GY
RAD ONC ARIA REFERENCE POINT DOSAGE GIVEN TO DATE: 24.81 GY
RAD ONC ARIA REFERENCE POINT ID: NORMAL
RAD ONC ARIA REFERENCE POINT SESSION DOSAGE GIVEN: 2 GY
RAD ONC ARIA REFERENCE POINT SESSION DOSAGE GIVEN: 2.7 GY
RAD ONC ARIA REFERENCE POINT SESSION DOSAGE GIVEN: 2.76 GY

## 2024-07-17 ENCOUNTER — HOSPITAL ENCOUNTER (OUTPATIENT)
Facility: HOSPITAL | Age: 69
Discharge: HOME | End: 2024-07-20
Attending: RADIOLOGY

## 2024-07-17 LAB
RAD ONC ARIA COURSE FIRST TREATMENT DATE: NORMAL
RAD ONC ARIA COURSE ID: NORMAL
RAD ONC ARIA COURSE INTENT: NORMAL
RAD ONC ARIA COURSE LAST TREATMENT DATE: NORMAL
RAD ONC ARIA COURSE SESSION NUMBER: 10
RAD ONC ARIA COURSE START DATE: NORMAL
RAD ONC ARIA COURSE TREATMENT ELAPSED DAYS: 15
RAD ONC ARIA PLAN FRACTIONS TREATED TO DATE: 10
RAD ONC ARIA PLAN ID: NORMAL
RAD ONC ARIA PLAN PRESCRIBED DOSE PER FRACTION: 2.7 GY
RAD ONC ARIA PLAN PRIMARY REFERENCE POINT: NORMAL
RAD ONC ARIA PLAN TOTAL FRACTIONS PRESCRIBED: 26
RAD ONC ARIA PLAN TOTAL PRESCRIBED DOSE: 7020 CGY
RAD ONC ARIA REFERENCE POINT DOSAGE GIVEN TO DATE: 20 GY
RAD ONC ARIA REFERENCE POINT DOSAGE GIVEN TO DATE: 27 GY
RAD ONC ARIA REFERENCE POINT DOSAGE GIVEN TO DATE: 27.57 GY
RAD ONC ARIA REFERENCE POINT ID: NORMAL
RAD ONC ARIA REFERENCE POINT SESSION DOSAGE GIVEN: 2 GY
RAD ONC ARIA REFERENCE POINT SESSION DOSAGE GIVEN: 2.7 GY
RAD ONC ARIA REFERENCE POINT SESSION DOSAGE GIVEN: 2.76 GY

## 2024-07-18 ENCOUNTER — HOSPITAL ENCOUNTER (OUTPATIENT)
Facility: HOSPITAL | Age: 69
Discharge: HOME | End: 2024-07-21
Attending: RADIOLOGY

## 2024-07-18 LAB
RAD ONC ARIA COURSE FIRST TREATMENT DATE: NORMAL
RAD ONC ARIA COURSE ID: NORMAL
RAD ONC ARIA COURSE INTENT: NORMAL
RAD ONC ARIA COURSE LAST TREATMENT DATE: NORMAL
RAD ONC ARIA COURSE SESSION NUMBER: 11
RAD ONC ARIA COURSE START DATE: NORMAL
RAD ONC ARIA COURSE TREATMENT ELAPSED DAYS: 16
RAD ONC ARIA PLAN FRACTIONS TREATED TO DATE: 11
RAD ONC ARIA PLAN ID: NORMAL
RAD ONC ARIA PLAN PRESCRIBED DOSE PER FRACTION: 2.7 GY
RAD ONC ARIA PLAN PRIMARY REFERENCE POINT: NORMAL
RAD ONC ARIA PLAN TOTAL FRACTIONS PRESCRIBED: 26
RAD ONC ARIA PLAN TOTAL PRESCRIBED DOSE: 7020 CGY
RAD ONC ARIA REFERENCE POINT DOSAGE GIVEN TO DATE: 22 GY
RAD ONC ARIA REFERENCE POINT DOSAGE GIVEN TO DATE: 29.7 GY
RAD ONC ARIA REFERENCE POINT DOSAGE GIVEN TO DATE: 30.32 GY
RAD ONC ARIA REFERENCE POINT ID: NORMAL
RAD ONC ARIA REFERENCE POINT SESSION DOSAGE GIVEN: 2 GY
RAD ONC ARIA REFERENCE POINT SESSION DOSAGE GIVEN: 2.7 GY
RAD ONC ARIA REFERENCE POINT SESSION DOSAGE GIVEN: 2.76 GY

## 2024-07-19 ENCOUNTER — HOSPITAL ENCOUNTER (OUTPATIENT)
Facility: HOSPITAL | Age: 69
Discharge: HOME | End: 2024-07-22
Attending: RADIOLOGY

## 2024-07-19 LAB
RAD ONC ARIA COURSE FIRST TREATMENT DATE: NORMAL
RAD ONC ARIA COURSE ID: NORMAL
RAD ONC ARIA COURSE INTENT: NORMAL
RAD ONC ARIA COURSE LAST TREATMENT DATE: NORMAL
RAD ONC ARIA COURSE SESSION NUMBER: 12
RAD ONC ARIA COURSE START DATE: NORMAL
RAD ONC ARIA COURSE TREATMENT ELAPSED DAYS: 17
RAD ONC ARIA PLAN FRACTIONS TREATED TO DATE: 12
RAD ONC ARIA PLAN ID: NORMAL
RAD ONC ARIA PLAN PRESCRIBED DOSE PER FRACTION: 2.7 GY
RAD ONC ARIA PLAN PRIMARY REFERENCE POINT: NORMAL
RAD ONC ARIA PLAN TOTAL FRACTIONS PRESCRIBED: 26
RAD ONC ARIA PLAN TOTAL PRESCRIBED DOSE: 7020 CGY
RAD ONC ARIA REFERENCE POINT DOSAGE GIVEN TO DATE: 24 GY
RAD ONC ARIA REFERENCE POINT DOSAGE GIVEN TO DATE: 32.4 GY
RAD ONC ARIA REFERENCE POINT DOSAGE GIVEN TO DATE: 33.08 GY
RAD ONC ARIA REFERENCE POINT ID: NORMAL
RAD ONC ARIA REFERENCE POINT SESSION DOSAGE GIVEN: 2 GY
RAD ONC ARIA REFERENCE POINT SESSION DOSAGE GIVEN: 2.7 GY
RAD ONC ARIA REFERENCE POINT SESSION DOSAGE GIVEN: 2.76 GY

## 2024-07-22 ENCOUNTER — HOSPITAL ENCOUNTER (OUTPATIENT)
Facility: HOSPITAL | Age: 69
Discharge: HOME | End: 2024-07-25
Attending: RADIOLOGY

## 2024-07-22 DIAGNOSIS — C61 MALIGNANT NEOPLASM OF PROSTATE (HCC): Primary | ICD-10-CM

## 2024-07-22 LAB
RAD ONC ARIA COURSE FIRST TREATMENT DATE: NORMAL
RAD ONC ARIA COURSE ID: NORMAL
RAD ONC ARIA COURSE INTENT: NORMAL
RAD ONC ARIA COURSE LAST TREATMENT DATE: NORMAL
RAD ONC ARIA COURSE SESSION NUMBER: 13
RAD ONC ARIA COURSE START DATE: NORMAL
RAD ONC ARIA COURSE TREATMENT ELAPSED DAYS: 20
RAD ONC ARIA PLAN FRACTIONS TREATED TO DATE: 13
RAD ONC ARIA PLAN ID: NORMAL
RAD ONC ARIA PLAN PRESCRIBED DOSE PER FRACTION: 2.7 GY
RAD ONC ARIA PLAN PRIMARY REFERENCE POINT: NORMAL
RAD ONC ARIA PLAN TOTAL FRACTIONS PRESCRIBED: 26
RAD ONC ARIA PLAN TOTAL PRESCRIBED DOSE: 7020 CGY
RAD ONC ARIA REFERENCE POINT DOSAGE GIVEN TO DATE: 26 GY
RAD ONC ARIA REFERENCE POINT DOSAGE GIVEN TO DATE: 35.1 GY
RAD ONC ARIA REFERENCE POINT DOSAGE GIVEN TO DATE: 35.84 GY
RAD ONC ARIA REFERENCE POINT ID: NORMAL
RAD ONC ARIA REFERENCE POINT SESSION DOSAGE GIVEN: 2 GY
RAD ONC ARIA REFERENCE POINT SESSION DOSAGE GIVEN: 2.7 GY
RAD ONC ARIA REFERENCE POINT SESSION DOSAGE GIVEN: 2.76 GY

## 2024-07-22 NOTE — PROGRESS NOTES
Cancer [place] at [facility]  [facility]    Radiation Oncology Weekly Progress Note  Encounter Date: 07/22/24    Cezar Henley  [id number]  1955     Diagnosis       ICD-10-CM    1. Malignant neoplasm of prostate (HCC)  C61          Cancer Staging   Malignant neoplasm of prostate (HCC)  Staging form: Prostate, AJCC 8th Edition  - Clinical stage from 4/8/2024: Stage IIIB (cT3b, cN0, cM0, PSA: 4.9, Grade Group: 3) - Signed by Erwin Nix MD on 5/31/2024    AJCC Staging has been reviewed.  Oncologic History   67 yo M with high risk prostate cancer, cT1c but rT3bN0, iPSA 4.88, [place] 4+3=7 in 1 core (4/12 total cores involved).     Interval History   Mr. Henley was seen today for his weekly on-treatment evaluation    07/10/2024: at fraction 5, first OTV, noting some softer stools, more urinary urgency and frequency since starting. He is currently on alfuzosin. Reviewed treatment expectations and logistics.  7/15/24: Add fraction 8 with increased  frequency, nocturia, and incomplete emptying.  On alfuzosin once daily.  Recommend increasing to twice daily dosing.  Was having increased GI frequency but started Imodium preemptively which has helped.  No dysuria.  Grade 1 fatigue/hot flashes from ADT.  7/22/24: at fraction 13 with stable, mild increased  frequency, on alfuzosin. GI frequency stable with likely hemorrhoid flair over the weekend.    Treatment Details   Treatment Site: Prostate, SV, Pelvic Nodes  Treatment Technique: IMRT/VMAT  Treatment Site Dose/Fx (cGy) #Fx Delivered Dose (cGy) Total Planned Dose (cGy) Start Date End Date   Prostate, SV  Pelvic Nodes 270  200 13/26 3510  2600 7020  5200 07/02/24 07/22/24     Concurrent Therapy: Concurrent ADT: firmagon 240mg 6/20/24  Response to treatment: N/A    Allergies and Medications   No Known Allergies    Current Outpatient Medications   Medication Instructions    alfuzosin (UROXATRAL) 10 mg, Oral, EVERY BEDTIME

## 2024-07-23 ENCOUNTER — HOSPITAL ENCOUNTER (OUTPATIENT)
Facility: HOSPITAL | Age: 69
Discharge: HOME | End: 2024-07-26
Attending: RADIOLOGY

## 2024-07-23 LAB
RAD ONC ARIA COURSE FIRST TREATMENT DATE: NORMAL
RAD ONC ARIA COURSE ID: NORMAL
RAD ONC ARIA COURSE INTENT: NORMAL
RAD ONC ARIA COURSE LAST TREATMENT DATE: NORMAL
RAD ONC ARIA COURSE SESSION NUMBER: 14
RAD ONC ARIA COURSE START DATE: NORMAL
RAD ONC ARIA COURSE TREATMENT ELAPSED DAYS: 21
RAD ONC ARIA PLAN FRACTIONS TREATED TO DATE: 14
RAD ONC ARIA PLAN ID: NORMAL
RAD ONC ARIA PLAN PRESCRIBED DOSE PER FRACTION: 2.7 GY
RAD ONC ARIA PLAN PRIMARY REFERENCE POINT: NORMAL
RAD ONC ARIA PLAN TOTAL FRACTIONS PRESCRIBED: 26
RAD ONC ARIA PLAN TOTAL PRESCRIBED DOSE: 7020 CGY
RAD ONC ARIA REFERENCE POINT DOSAGE GIVEN TO DATE: 28 GY
RAD ONC ARIA REFERENCE POINT DOSAGE GIVEN TO DATE: 37.8 GY
RAD ONC ARIA REFERENCE POINT DOSAGE GIVEN TO DATE: 38.59 GY
RAD ONC ARIA REFERENCE POINT ID: NORMAL
RAD ONC ARIA REFERENCE POINT SESSION DOSAGE GIVEN: 2 GY
RAD ONC ARIA REFERENCE POINT SESSION DOSAGE GIVEN: 2.7 GY
RAD ONC ARIA REFERENCE POINT SESSION DOSAGE GIVEN: 2.76 GY

## 2024-07-24 ENCOUNTER — HOSPITAL ENCOUNTER (OUTPATIENT)
Facility: HOSPITAL | Age: 69
Discharge: HOME | End: 2024-07-27
Attending: RADIOLOGY

## 2024-07-24 LAB
RAD ONC ARIA COURSE FIRST TREATMENT DATE: NORMAL
RAD ONC ARIA COURSE ID: NORMAL
RAD ONC ARIA COURSE INTENT: NORMAL
RAD ONC ARIA COURSE LAST TREATMENT DATE: NORMAL
RAD ONC ARIA COURSE SESSION NUMBER: 15
RAD ONC ARIA COURSE START DATE: NORMAL
RAD ONC ARIA COURSE TREATMENT ELAPSED DAYS: 22
RAD ONC ARIA PLAN FRACTIONS TREATED TO DATE: 15
RAD ONC ARIA PLAN ID: NORMAL
RAD ONC ARIA PLAN PRESCRIBED DOSE PER FRACTION: 2.7 GY
RAD ONC ARIA PLAN PRIMARY REFERENCE POINT: NORMAL
RAD ONC ARIA PLAN TOTAL FRACTIONS PRESCRIBED: 26
RAD ONC ARIA PLAN TOTAL PRESCRIBED DOSE: 7020 CGY
RAD ONC ARIA REFERENCE POINT DOSAGE GIVEN TO DATE: 30 GY
RAD ONC ARIA REFERENCE POINT DOSAGE GIVEN TO DATE: 40.5 GY
RAD ONC ARIA REFERENCE POINT DOSAGE GIVEN TO DATE: 41.35 GY
RAD ONC ARIA REFERENCE POINT ID: NORMAL
RAD ONC ARIA REFERENCE POINT SESSION DOSAGE GIVEN: 2 GY
RAD ONC ARIA REFERENCE POINT SESSION DOSAGE GIVEN: 2.7 GY
RAD ONC ARIA REFERENCE POINT SESSION DOSAGE GIVEN: 2.76 GY

## 2024-07-25 ENCOUNTER — HOSPITAL ENCOUNTER (OUTPATIENT)
Facility: HOSPITAL | Age: 69
Discharge: HOME | End: 2024-07-28
Attending: RADIOLOGY

## 2024-07-25 LAB
RAD ONC ARIA COURSE FIRST TREATMENT DATE: NORMAL
RAD ONC ARIA COURSE ID: NORMAL
RAD ONC ARIA COURSE INTENT: NORMAL
RAD ONC ARIA COURSE LAST TREATMENT DATE: NORMAL
RAD ONC ARIA COURSE SESSION NUMBER: 16
RAD ONC ARIA COURSE START DATE: NORMAL
RAD ONC ARIA COURSE TREATMENT ELAPSED DAYS: 23
RAD ONC ARIA PLAN FRACTIONS TREATED TO DATE: 16
RAD ONC ARIA PLAN ID: NORMAL
RAD ONC ARIA PLAN PRESCRIBED DOSE PER FRACTION: 2.7 GY
RAD ONC ARIA PLAN PRIMARY REFERENCE POINT: NORMAL
RAD ONC ARIA PLAN TOTAL FRACTIONS PRESCRIBED: 26
RAD ONC ARIA PLAN TOTAL PRESCRIBED DOSE: 7020 CGY
RAD ONC ARIA REFERENCE POINT DOSAGE GIVEN TO DATE: 32 GY
RAD ONC ARIA REFERENCE POINT DOSAGE GIVEN TO DATE: 43.2 GY
RAD ONC ARIA REFERENCE POINT DOSAGE GIVEN TO DATE: 44.11 GY
RAD ONC ARIA REFERENCE POINT ID: NORMAL
RAD ONC ARIA REFERENCE POINT SESSION DOSAGE GIVEN: 2 GY
RAD ONC ARIA REFERENCE POINT SESSION DOSAGE GIVEN: 2.7 GY
RAD ONC ARIA REFERENCE POINT SESSION DOSAGE GIVEN: 2.76 GY

## 2024-07-26 ENCOUNTER — HOSPITAL ENCOUNTER (OUTPATIENT)
Facility: HOSPITAL | Age: 69
Discharge: HOME | End: 2024-07-29
Attending: RADIOLOGY

## 2024-07-26 LAB
RAD ONC ARIA COURSE FIRST TREATMENT DATE: NORMAL
RAD ONC ARIA COURSE ID: NORMAL
RAD ONC ARIA COURSE INTENT: NORMAL
RAD ONC ARIA COURSE LAST TREATMENT DATE: NORMAL
RAD ONC ARIA COURSE SESSION NUMBER: 17
RAD ONC ARIA COURSE START DATE: NORMAL
RAD ONC ARIA COURSE TREATMENT ELAPSED DAYS: 24
RAD ONC ARIA PLAN FRACTIONS TREATED TO DATE: 17
RAD ONC ARIA PLAN ID: NORMAL
RAD ONC ARIA PLAN PRESCRIBED DOSE PER FRACTION: 2.7 GY
RAD ONC ARIA PLAN PRIMARY REFERENCE POINT: NORMAL
RAD ONC ARIA PLAN TOTAL FRACTIONS PRESCRIBED: 26
RAD ONC ARIA PLAN TOTAL PRESCRIBED DOSE: 7020 CGY
RAD ONC ARIA REFERENCE POINT DOSAGE GIVEN TO DATE: 34 GY
RAD ONC ARIA REFERENCE POINT DOSAGE GIVEN TO DATE: 45.9 GY
RAD ONC ARIA REFERENCE POINT DOSAGE GIVEN TO DATE: 46.86 GY
RAD ONC ARIA REFERENCE POINT ID: NORMAL
RAD ONC ARIA REFERENCE POINT SESSION DOSAGE GIVEN: 2 GY
RAD ONC ARIA REFERENCE POINT SESSION DOSAGE GIVEN: 2.7 GY
RAD ONC ARIA REFERENCE POINT SESSION DOSAGE GIVEN: 2.76 GY

## 2024-07-29 ENCOUNTER — HOSPITAL ENCOUNTER (OUTPATIENT)
Facility: HOSPITAL | Age: 69
Discharge: HOME | End: 2024-08-01
Attending: RADIOLOGY

## 2024-07-29 DIAGNOSIS — C61 MALIGNANT NEOPLASM OF PROSTATE (HCC): Primary | ICD-10-CM

## 2024-07-29 LAB
RAD ONC ARIA COURSE FIRST TREATMENT DATE: NORMAL
RAD ONC ARIA COURSE ID: NORMAL
RAD ONC ARIA COURSE INTENT: NORMAL
RAD ONC ARIA COURSE LAST TREATMENT DATE: NORMAL
RAD ONC ARIA COURSE SESSION NUMBER: 18
RAD ONC ARIA COURSE START DATE: NORMAL
RAD ONC ARIA COURSE TREATMENT ELAPSED DAYS: 27
RAD ONC ARIA PLAN FRACTIONS TREATED TO DATE: 18
RAD ONC ARIA PLAN ID: NORMAL
RAD ONC ARIA PLAN PRESCRIBED DOSE PER FRACTION: 2.7 GY
RAD ONC ARIA PLAN PRIMARY REFERENCE POINT: NORMAL
RAD ONC ARIA PLAN TOTAL FRACTIONS PRESCRIBED: 26
RAD ONC ARIA PLAN TOTAL PRESCRIBED DOSE: 7020 CGY
RAD ONC ARIA REFERENCE POINT DOSAGE GIVEN TO DATE: 36 GY
RAD ONC ARIA REFERENCE POINT DOSAGE GIVEN TO DATE: 48.6 GY
RAD ONC ARIA REFERENCE POINT DOSAGE GIVEN TO DATE: 49.62 GY
RAD ONC ARIA REFERENCE POINT ID: NORMAL
RAD ONC ARIA REFERENCE POINT SESSION DOSAGE GIVEN: 2 GY
RAD ONC ARIA REFERENCE POINT SESSION DOSAGE GIVEN: 2.7 GY
RAD ONC ARIA REFERENCE POINT SESSION DOSAGE GIVEN: 2.76 GY

## 2024-07-29 ASSESSMENT — PAIN SCALES - GENERAL: PAINLEVEL_OUTOF10: 0

## 2024-07-29 NOTE — PROGRESS NOTES
6/20/24  Response to treatment: N/A    Allergies and Medications   No Known Allergies    Current Outpatient Medications   Medication Instructions    alfuzosin (UROXATRAL) 10 mg, Oral, EVERY BEDTIME    amLODIPine (NORVASC) 5 MG tablet 1 tablet Orally Once a day for 1    aspirin 325 mg, 2 TIMES DAILY    aspirin 81 mg, DAILY    atorvastatin (LIPITOR) 20 mg, Oral    docusate (COLACE, DULCOLAX) 100 mg, 2 TIMES DAILY    FLUoxetine (PROZAC) 20 MG capsule PLEASE SEE ATTACHED FOR DETAILED DIRECTIONS    gabapentin (NEURONTIN) 200 mg, NIGHTLY    glipiZIDE (GLUCOTROL) 5 MG tablet     lisinopril (PRINIVIL;ZESTRIL) 40 mg, Oral, DAILY    loperamide (IMODIUM) 2 mg, Oral, DAILY    metFORMIN (GLUCOPHAGE) 500 mg, Oral, 2 TIMES DAILY WITH MEALS    zolpidem (AMBIEN) 5 mg, NIGHTLY PRN        Physical Exam   Vitals: Pain Level: 0  Performance Status: ECOG 0, fully active, able to carry on all predisease activities without restrictions  Constitutional: Pleasant, sitting comfortably in no acute distress.  Respiratory: Non-labored breathing  Neurologic: Alert and oriented.  Psychiatric: Cooperative to commands and responds to questions appropriately.    Assessment & Plan   - Continue radiation treatment as planned  - Trial of imodium 1/2 tab qod  - Treatment setup and plan reviewed. Port images/CBCT images reviewed. Appropriate laboratory work was reviewed.   - Treatment side effects and toxicities reviewed with the patient, and appropriate management was advised as detailed above.     Erwin Nix MD  [facility]  [facility]  [id number] [place] [id number]  P: [phone]  [facility] [address], [place] [address]  P: [phone]  [facility]  [address] [place] [address], [address], [place] [address]  P: [phone]

## 2024-07-30 ENCOUNTER — HOSPITAL ENCOUNTER (OUTPATIENT)
Facility: HOSPITAL | Age: 69
Discharge: HOME | End: 2024-08-02
Attending: RADIOLOGY

## 2024-07-30 LAB
RAD ONC ARIA COURSE FIRST TREATMENT DATE: NORMAL
RAD ONC ARIA COURSE ID: NORMAL
RAD ONC ARIA COURSE INTENT: NORMAL
RAD ONC ARIA COURSE LAST TREATMENT DATE: NORMAL
RAD ONC ARIA COURSE SESSION NUMBER: 19
RAD ONC ARIA COURSE START DATE: NORMAL
RAD ONC ARIA COURSE TREATMENT ELAPSED DAYS: 28
RAD ONC ARIA PLAN FRACTIONS TREATED TO DATE: 19
RAD ONC ARIA PLAN ID: NORMAL
RAD ONC ARIA PLAN PRESCRIBED DOSE PER FRACTION: 2.7 GY
RAD ONC ARIA PLAN PRIMARY REFERENCE POINT: NORMAL
RAD ONC ARIA PLAN TOTAL FRACTIONS PRESCRIBED: 26
RAD ONC ARIA PLAN TOTAL PRESCRIBED DOSE: 7020 CGY
RAD ONC ARIA REFERENCE POINT DOSAGE GIVEN TO DATE: 38 GY
RAD ONC ARIA REFERENCE POINT DOSAGE GIVEN TO DATE: 51.3 GY
RAD ONC ARIA REFERENCE POINT DOSAGE GIVEN TO DATE: 52.38 GY
RAD ONC ARIA REFERENCE POINT ID: NORMAL
RAD ONC ARIA REFERENCE POINT SESSION DOSAGE GIVEN: 2 GY
RAD ONC ARIA REFERENCE POINT SESSION DOSAGE GIVEN: 2.7 GY
RAD ONC ARIA REFERENCE POINT SESSION DOSAGE GIVEN: 2.76 GY

## 2024-07-31 ENCOUNTER — HOSPITAL ENCOUNTER (OUTPATIENT)
Facility: HOSPITAL | Age: 69
Discharge: HOME | End: 2024-08-03
Attending: RADIOLOGY

## 2024-07-31 LAB
RAD ONC ARIA COURSE FIRST TREATMENT DATE: NORMAL
RAD ONC ARIA COURSE ID: NORMAL
RAD ONC ARIA COURSE INTENT: NORMAL
RAD ONC ARIA COURSE LAST TREATMENT DATE: NORMAL
RAD ONC ARIA COURSE SESSION NUMBER: 20
RAD ONC ARIA COURSE START DATE: NORMAL
RAD ONC ARIA COURSE TREATMENT ELAPSED DAYS: 29
RAD ONC ARIA PLAN FRACTIONS TREATED TO DATE: 20
RAD ONC ARIA PLAN ID: NORMAL
RAD ONC ARIA PLAN PRESCRIBED DOSE PER FRACTION: 2.7 GY
RAD ONC ARIA PLAN PRIMARY REFERENCE POINT: NORMAL
RAD ONC ARIA PLAN TOTAL FRACTIONS PRESCRIBED: 26
RAD ONC ARIA PLAN TOTAL PRESCRIBED DOSE: 7020 CGY
RAD ONC ARIA REFERENCE POINT DOSAGE GIVEN TO DATE: 40 GY
RAD ONC ARIA REFERENCE POINT DOSAGE GIVEN TO DATE: 54 GY
RAD ONC ARIA REFERENCE POINT DOSAGE GIVEN TO DATE: 55.13 GY
RAD ONC ARIA REFERENCE POINT ID: NORMAL
RAD ONC ARIA REFERENCE POINT SESSION DOSAGE GIVEN: 2 GY
RAD ONC ARIA REFERENCE POINT SESSION DOSAGE GIVEN: 2.7 GY
RAD ONC ARIA REFERENCE POINT SESSION DOSAGE GIVEN: 2.76 GY

## 2024-08-01 ENCOUNTER — HOSPITAL ENCOUNTER (OUTPATIENT)
Facility: HOSPITAL | Age: 69
Discharge: HOME | End: 2024-08-04
Attending: RADIOLOGY

## 2024-08-01 LAB
RAD ONC ARIA COURSE FIRST TREATMENT DATE: NORMAL
RAD ONC ARIA COURSE ID: NORMAL
RAD ONC ARIA COURSE INTENT: NORMAL
RAD ONC ARIA COURSE LAST TREATMENT DATE: NORMAL
RAD ONC ARIA COURSE SESSION NUMBER: 21
RAD ONC ARIA COURSE START DATE: NORMAL
RAD ONC ARIA COURSE TREATMENT ELAPSED DAYS: 30
RAD ONC ARIA PLAN FRACTIONS TREATED TO DATE: 21
RAD ONC ARIA PLAN ID: NORMAL
RAD ONC ARIA PLAN PRESCRIBED DOSE PER FRACTION: 2.7 GY
RAD ONC ARIA PLAN PRIMARY REFERENCE POINT: NORMAL
RAD ONC ARIA PLAN TOTAL FRACTIONS PRESCRIBED: 26
RAD ONC ARIA PLAN TOTAL PRESCRIBED DOSE: 7020 CGY
RAD ONC ARIA REFERENCE POINT DOSAGE GIVEN TO DATE: 42 GY
RAD ONC ARIA REFERENCE POINT DOSAGE GIVEN TO DATE: 56.7 GY
RAD ONC ARIA REFERENCE POINT DOSAGE GIVEN TO DATE: 57.89 GY
RAD ONC ARIA REFERENCE POINT ID: NORMAL
RAD ONC ARIA REFERENCE POINT SESSION DOSAGE GIVEN: 2 GY
RAD ONC ARIA REFERENCE POINT SESSION DOSAGE GIVEN: 2.7 GY
RAD ONC ARIA REFERENCE POINT SESSION DOSAGE GIVEN: 2.76 GY

## 2024-08-02 ENCOUNTER — HOSPITAL ENCOUNTER (OUTPATIENT)
Facility: HOSPITAL | Age: 69
Discharge: HOME | End: 2024-08-05
Attending: RADIOLOGY

## 2024-08-02 LAB
RAD ONC ARIA COURSE FIRST TREATMENT DATE: NORMAL
RAD ONC ARIA COURSE ID: NORMAL
RAD ONC ARIA COURSE INTENT: NORMAL
RAD ONC ARIA COURSE LAST TREATMENT DATE: NORMAL
RAD ONC ARIA COURSE SESSION NUMBER: 22
RAD ONC ARIA COURSE START DATE: NORMAL
RAD ONC ARIA COURSE TREATMENT ELAPSED DAYS: 31
RAD ONC ARIA PLAN FRACTIONS TREATED TO DATE: 22
RAD ONC ARIA PLAN ID: NORMAL
RAD ONC ARIA PLAN PRESCRIBED DOSE PER FRACTION: 2.7 GY
RAD ONC ARIA PLAN PRIMARY REFERENCE POINT: NORMAL
RAD ONC ARIA PLAN TOTAL FRACTIONS PRESCRIBED: 26
RAD ONC ARIA PLAN TOTAL PRESCRIBED DOSE: 7020 CGY
RAD ONC ARIA REFERENCE POINT DOSAGE GIVEN TO DATE: 44 GY
RAD ONC ARIA REFERENCE POINT DOSAGE GIVEN TO DATE: 59.4 GY
RAD ONC ARIA REFERENCE POINT DOSAGE GIVEN TO DATE: 60.65 GY
RAD ONC ARIA REFERENCE POINT ID: NORMAL
RAD ONC ARIA REFERENCE POINT SESSION DOSAGE GIVEN: 2 GY
RAD ONC ARIA REFERENCE POINT SESSION DOSAGE GIVEN: 2.7 GY
RAD ONC ARIA REFERENCE POINT SESSION DOSAGE GIVEN: 2.76 GY

## 2024-08-05 ENCOUNTER — HOSPITAL ENCOUNTER (OUTPATIENT)
Facility: HOSPITAL | Age: 69
Discharge: HOME | End: 2024-08-08
Attending: RADIOLOGY

## 2024-08-05 DIAGNOSIS — C61 MALIGNANT NEOPLASM OF PROSTATE (HCC): Primary | ICD-10-CM

## 2024-08-05 LAB
RAD ONC ARIA COURSE FIRST TREATMENT DATE: NORMAL
RAD ONC ARIA COURSE ID: NORMAL
RAD ONC ARIA COURSE INTENT: NORMAL
RAD ONC ARIA COURSE LAST TREATMENT DATE: NORMAL
RAD ONC ARIA COURSE SESSION NUMBER: 23
RAD ONC ARIA COURSE START DATE: NORMAL
RAD ONC ARIA COURSE TREATMENT ELAPSED DAYS: 34
RAD ONC ARIA PLAN FRACTIONS TREATED TO DATE: 23
RAD ONC ARIA PLAN ID: NORMAL
RAD ONC ARIA PLAN PRESCRIBED DOSE PER FRACTION: 2.7 GY
RAD ONC ARIA PLAN PRIMARY REFERENCE POINT: NORMAL
RAD ONC ARIA PLAN TOTAL FRACTIONS PRESCRIBED: 26
RAD ONC ARIA PLAN TOTAL PRESCRIBED DOSE: 7020 CGY
RAD ONC ARIA REFERENCE POINT DOSAGE GIVEN TO DATE: 46 GY
RAD ONC ARIA REFERENCE POINT DOSAGE GIVEN TO DATE: 62.1 GY
RAD ONC ARIA REFERENCE POINT DOSAGE GIVEN TO DATE: 63.4 GY
RAD ONC ARIA REFERENCE POINT ID: NORMAL
RAD ONC ARIA REFERENCE POINT SESSION DOSAGE GIVEN: 2 GY
RAD ONC ARIA REFERENCE POINT SESSION DOSAGE GIVEN: 2.7 GY
RAD ONC ARIA REFERENCE POINT SESSION DOSAGE GIVEN: 2.76 GY

## 2024-08-05 ASSESSMENT — PAIN SCALES - GENERAL: PAINLEVEL_OUTOF10: 0

## 2024-08-05 NOTE — PROGRESS NOTES
[facility] [place] [facility]  [facility]    Radiation Oncology Weekly Progress Note  Encounter Date: 08/05/24    Cezar Henley  [id number]  1955     Diagnosis       ICD-10-CM    1. Malignant neoplasm of prostate (HCC)  C61          Cancer Staging   Malignant neoplasm of prostate (HCC)  Staging form: Prostate, AJCC 8th Edition  - Clinical stage from 4/8/2024: Stage IIIB (cT3b, cN0, cM0, PSA: 4.9, Grade Group: 3) - Signed by Erwin Nix MD on 5/31/2024    AJCC Staging has been reviewed.  Oncologic History   67 yo M with high risk prostate cancer, cT1c but rT3bN0, iPSA 4.88, [place] 4+3=7 in 1 core (4/12 total cores involved).     Interval History   Mr. Henley was seen today for his weekly on-treatment evaluation    07/10/2024: at fraction 5, first OTV, noting some softer stools, more urinary urgency and frequency since starting. He is currently on alfuzosin. Reviewed treatment expectations and logistics.  7/15/24: Add fraction 8 with increased  frequency, nocturia, and incomplete emptying.  On alfuzosin once daily.  Recommend increasing to twice daily dosing.  Was having increased GI frequency but started Imodium preemptively which has helped.  No dysuria.  Grade 1 fatigue/hot flashes from ADT.  7/22/24: at fraction 13 with stable, mild increased  frequency, on alfuzosin. GI frequency stable with likely hemorrhoid flair over the weekend.  7/29/24: at fraction 18 with looser stools/increased GI frequency. Discussed adding 1/2 tab imodium daily or qod to provide relief. Stable increased  frequency/nocturia and remains on alfuzosin.  8/5/24: at fraction 23 with stable /GI function.  Continues using Imodium which helps GI frequency.  And remains on alfuzosin for  symptoms.  Will complete treatment on Wednesday and follow-up in 3 months with PSA.  Also gave flier on VIPER trial today which he will read about and let us know if he wants to proceed.    Treatment

## 2024-08-06 ENCOUNTER — HOSPITAL ENCOUNTER (OUTPATIENT)
Facility: HOSPITAL | Age: 69
Discharge: HOME | End: 2024-08-09
Attending: RADIOLOGY

## 2024-08-06 LAB
RAD ONC ARIA COURSE FIRST TREATMENT DATE: NORMAL
RAD ONC ARIA COURSE ID: NORMAL
RAD ONC ARIA COURSE INTENT: NORMAL
RAD ONC ARIA COURSE LAST TREATMENT DATE: NORMAL
RAD ONC ARIA COURSE SESSION NUMBER: 24
RAD ONC ARIA COURSE START DATE: NORMAL
RAD ONC ARIA COURSE TREATMENT ELAPSED DAYS: 35
RAD ONC ARIA PLAN FRACTIONS TREATED TO DATE: 24
RAD ONC ARIA PLAN ID: NORMAL
RAD ONC ARIA PLAN PRESCRIBED DOSE PER FRACTION: 2.7 GY
RAD ONC ARIA PLAN PRIMARY REFERENCE POINT: NORMAL
RAD ONC ARIA PLAN TOTAL FRACTIONS PRESCRIBED: 26
RAD ONC ARIA PLAN TOTAL PRESCRIBED DOSE: 7020 CGY
RAD ONC ARIA REFERENCE POINT DOSAGE GIVEN TO DATE: 48 GY
RAD ONC ARIA REFERENCE POINT DOSAGE GIVEN TO DATE: 64.8 GY
RAD ONC ARIA REFERENCE POINT DOSAGE GIVEN TO DATE: 66.16 GY
RAD ONC ARIA REFERENCE POINT ID: NORMAL
RAD ONC ARIA REFERENCE POINT SESSION DOSAGE GIVEN: 2 GY
RAD ONC ARIA REFERENCE POINT SESSION DOSAGE GIVEN: 2.7 GY
RAD ONC ARIA REFERENCE POINT SESSION DOSAGE GIVEN: 2.76 GY

## 2024-08-07 ENCOUNTER — HOSPITAL ENCOUNTER (OUTPATIENT)
Facility: HOSPITAL | Age: 69
Discharge: HOME | End: 2024-08-10
Attending: RADIOLOGY

## 2024-08-07 LAB
RAD ONC ARIA COURSE FIRST TREATMENT DATE: NORMAL
RAD ONC ARIA COURSE ID: NORMAL
RAD ONC ARIA COURSE INTENT: NORMAL
RAD ONC ARIA COURSE LAST TREATMENT DATE: NORMAL
RAD ONC ARIA COURSE SESSION NUMBER: 25
RAD ONC ARIA COURSE START DATE: NORMAL
RAD ONC ARIA COURSE TREATMENT ELAPSED DAYS: 36
RAD ONC ARIA PLAN FRACTIONS TREATED TO DATE: 25
RAD ONC ARIA PLAN ID: NORMAL
RAD ONC ARIA PLAN PRESCRIBED DOSE PER FRACTION: 2.7 GY
RAD ONC ARIA PLAN PRIMARY REFERENCE POINT: NORMAL
RAD ONC ARIA PLAN TOTAL FRACTIONS PRESCRIBED: 26
RAD ONC ARIA PLAN TOTAL PRESCRIBED DOSE: 7020 CGY
RAD ONC ARIA REFERENCE POINT DOSAGE GIVEN TO DATE: 50 GY
RAD ONC ARIA REFERENCE POINT DOSAGE GIVEN TO DATE: 67.5 GY
RAD ONC ARIA REFERENCE POINT DOSAGE GIVEN TO DATE: 68.92 GY
RAD ONC ARIA REFERENCE POINT ID: NORMAL
RAD ONC ARIA REFERENCE POINT SESSION DOSAGE GIVEN: 2 GY
RAD ONC ARIA REFERENCE POINT SESSION DOSAGE GIVEN: 2.7 GY
RAD ONC ARIA REFERENCE POINT SESSION DOSAGE GIVEN: 2.76 GY

## 2024-08-08 ENCOUNTER — HOSPITAL ENCOUNTER (OUTPATIENT)
Facility: HOSPITAL | Age: 69
Discharge: HOME | End: 2024-08-11
Attending: RADIOLOGY

## 2024-08-08 LAB
RAD ONC ARIA COURSE FIRST TREATMENT DATE: NORMAL
RAD ONC ARIA COURSE ID: NORMAL
RAD ONC ARIA COURSE INTENT: NORMAL
RAD ONC ARIA COURSE LAST TREATMENT DATE: NORMAL
RAD ONC ARIA COURSE SESSION NUMBER: 26
RAD ONC ARIA COURSE START DATE: NORMAL
RAD ONC ARIA COURSE TREATMENT ELAPSED DAYS: 37
RAD ONC ARIA PLAN FRACTIONS TREATED TO DATE: 26
RAD ONC ARIA PLAN ID: NORMAL
RAD ONC ARIA PLAN PRESCRIBED DOSE PER FRACTION: 2.7 GY
RAD ONC ARIA PLAN PRIMARY REFERENCE POINT: NORMAL
RAD ONC ARIA PLAN TOTAL FRACTIONS PRESCRIBED: 26
RAD ONC ARIA PLAN TOTAL PRESCRIBED DOSE: 7020 CGY
RAD ONC ARIA REFERENCE POINT DOSAGE GIVEN TO DATE: 52 GY
RAD ONC ARIA REFERENCE POINT DOSAGE GIVEN TO DATE: 70.2 GY
RAD ONC ARIA REFERENCE POINT DOSAGE GIVEN TO DATE: 71.67 GY
RAD ONC ARIA REFERENCE POINT ID: NORMAL
RAD ONC ARIA REFERENCE POINT SESSION DOSAGE GIVEN: 2 GY
RAD ONC ARIA REFERENCE POINT SESSION DOSAGE GIVEN: 2.76 GY

## 2024-11-22 ENCOUNTER — CLINICAL DOCUMENTATION (OUTPATIENT)
Facility: HOSPITAL | Age: 69
End: 2024-11-22

## 2024-11-22 NOTE — PROGRESS NOTES
[facility] [place] [facility]  [facility]      RADIATION ONCOLOGY CLINICAL END OF TREATMENT NOTE    Encounter Date: 11/22/24   Patient Name: Cezar Henley  YOB: 1955  Medical Record Number: [id number]    DIAGNOSIS:       ICD-10-CM       1. Malignant neoplasm of prostate (HCC)  C61           STAGING:   Cancer Staging   Malignant neoplasm of prostate (HCC)  Staging form: Prostate, AJCC 8th Edition  - Clinical stage from 4/8/2024: Stage IIIB (cT3b, cN0, cM0, PSA: 4.9, Grade Group: 3) - Signed by Erwin Nix MD on 5/31/2024  AJCC Staging has been reviewed    ASSESSMENT:   69 yo M with high risk prostate cancer, cT1c but rT3bN0, iPSA 4.88, [place] 4+3=7 in 1 core (4/12 total cores involved).       TREATMENT SUMMARY:     Treatment Site Modality Dose/Fx (cGy) #Fx Total Dose (cGy) Start Date End Date Elapsed Days   Prostate/SV  Pelvic LN IMRT 270  804 56 1824  5200 7/2/24 8/8/24 38       CONCURRENT THERAPY: ADT x 18 months (firmagon 6/20/24)    TREATMENT RESPONSE:  Treatment completed as planned      MEDICATIONS:     Current Outpatient Medications:     loperamide (IMODIUM) 2 MG capsule, Take 1 capsule by mouth daily, Disp: , Rfl:     FLUoxetine (PROZAC) 20 MG capsule, PLEASE SEE ATTACHED FOR DETAILED DIRECTIONS, Disp: , Rfl:     alfuzosin (UROXATRAL) 10 MG extended release tablet, Take 1 tablet by mouth nightly, Disp: , Rfl:     amLODIPine (NORVASC) 5 MG tablet, 1 tablet Orally Once a day for 1, Disp: , Rfl:     atorvastatin (LIPITOR) 20 MG tablet, Take 1 tablet by mouth, Disp: , Rfl:     glipiZIDE (GLUCOTROL) 5 MG tablet, , Disp: , Rfl:     aspirin 81 MG chewable tablet, Take 1 tablet by mouth daily (Patient not taking: Reported on 7/10/2024), Disp: , Rfl:     aspirin 325 MG tablet, Take 325 mg by mouth 2 times daily (Patient not taking: Reported on 5/31/2024), Disp: , Rfl:     docusate (COLACE, DULCOLAX) 100 MG CAPS, Take 100 mg by mouth 2 times daily

## 2024-12-05 ENCOUNTER — TRANSCRIBE ORDERS (OUTPATIENT)
Facility: HOSPITAL | Age: 69
End: 2024-12-05

## 2024-12-05 DIAGNOSIS — C61 MALIGNANT NEOPLASM OF PROSTATE (HCC): Primary | ICD-10-CM

## 2024-12-13 LAB
PSA SERPL DL<=0.01 NG/ML-MCNC: <0.006 NG/ML (ref 0–4)
TESTOST SERPL-MCNC: <3 NG/DL (ref 264–916)

## 2024-12-20 ENCOUNTER — HOSPITAL ENCOUNTER (OUTPATIENT)
Facility: HOSPITAL | Age: 69
End: 2024-12-20
Attending: RADIOLOGY

## 2024-12-20 VITALS
SYSTOLIC BLOOD PRESSURE: 128 MMHG | DIASTOLIC BLOOD PRESSURE: 72 MMHG | RESPIRATION RATE: 16 BRPM | HEIGHT: 71 IN | WEIGHT: 234.2 LBS | BODY MASS INDEX: 32.79 KG/M2 | HEART RATE: 70 BPM | OXYGEN SATURATION: 95 %

## 2024-12-20 DIAGNOSIS — C61 MALIGNANT NEOPLASM OF PROSTATE (HCC): Primary | ICD-10-CM

## 2024-12-20 RX ORDER — SOLIFENACIN SUCCINATE 5 MG/1
5 TABLET, FILM COATED ORAL DAILY
COMMUNITY
Start: 2024-10-22

## 2024-12-20 ASSESSMENT — PAIN SCALES - GENERAL: PAINLEVEL_OUTOF10: 0

## 2025-03-21 ENCOUNTER — TRANSCRIBE ORDERS (OUTPATIENT)
Facility: HOSPITAL | Age: 70
End: 2025-03-21

## 2025-03-21 DIAGNOSIS — C61 MALIGNANT NEOPLASM OF PROSTATE (HCC): Primary | ICD-10-CM

## 2025-04-02 DIAGNOSIS — C61 MALIGNANT NEOPLASM OF PROSTATE (HCC): ICD-10-CM

## 2025-04-03 LAB
PSA SERPL DL<=0.01 NG/ML-MCNC: <0.006 NG/ML (ref 0–4)
TESTOST SERPL-MCNC: <3 NG/DL (ref 264–916)

## 2025-04-11 ENCOUNTER — HOSPITAL ENCOUNTER (OUTPATIENT)
Facility: HOSPITAL | Age: 70
End: 2025-04-11
Attending: RADIOLOGY

## 2025-04-11 VITALS
WEIGHT: 245 LBS | SYSTOLIC BLOOD PRESSURE: 138 MMHG | RESPIRATION RATE: 16 BRPM | OXYGEN SATURATION: 96 % | DIASTOLIC BLOOD PRESSURE: 70 MMHG | BODY MASS INDEX: 34.3 KG/M2 | HEART RATE: 65 BPM | HEIGHT: 71 IN

## 2025-04-11 DIAGNOSIS — C61 MALIGNANT NEOPLASM OF PROSTATE (HCC): Primary | ICD-10-CM

## 2025-04-11 RX ORDER — PIOGLITAZONE 15 MG/1
15 TABLET ORAL
COMMUNITY
Start: 2025-03-15 | End: 2025-06-13

## 2025-04-11 ASSESSMENT — PAIN SCALES - GENERAL: PAINLEVEL_OUTOF10: 0

## 2025-04-11 NOTE — PROGRESS NOTES
P: [phone]  [facility]  [facility] [address], [address], [place] [address]  P: [phone]

## 2025-07-01 ENCOUNTER — TRANSCRIBE ORDERS (OUTPATIENT)
Facility: HOSPITAL | Age: 70
End: 2025-07-01

## 2025-07-01 DIAGNOSIS — C61 MALIGNANT NEOPLASM OF PROSTATE (HCC): Primary | ICD-10-CM

## 2025-07-09 DIAGNOSIS — C61 MALIGNANT NEOPLASM OF PROSTATE (HCC): ICD-10-CM

## 2025-07-10 LAB
PSA SERPL DL<=0.01 NG/ML-MCNC: <0.006 NG/ML (ref 0–4)
TESTOST SERPL-MCNC: <3 NG/DL (ref 264–916)

## 2025-07-18 ENCOUNTER — TELEPHONE (OUTPATIENT)
Facility: HOSPITAL | Age: 70
End: 2025-07-18

## 2025-07-18 ENCOUNTER — HOSPITAL ENCOUNTER (OUTPATIENT)
Facility: HOSPITAL | Age: 70
End: 2025-07-18
Attending: RADIOLOGY

## 2025-07-18 VITALS
WEIGHT: 243 LBS | HEIGHT: 71 IN | RESPIRATION RATE: 16 BRPM | SYSTOLIC BLOOD PRESSURE: 136 MMHG | HEART RATE: 78 BPM | OXYGEN SATURATION: 96 % | BODY MASS INDEX: 34.02 KG/M2 | DIASTOLIC BLOOD PRESSURE: 60 MMHG

## 2025-07-18 DIAGNOSIS — C61 MALIGNANT NEOPLASM OF PROSTATE (HCC): Primary | ICD-10-CM

## 2025-07-18 DIAGNOSIS — R39.15 URINARY URGENCY: ICD-10-CM

## 2025-07-18 RX ORDER — OXYBUTYNIN CHLORIDE 10 MG/1
10 TABLET, EXTENDED RELEASE ORAL DAILY
Qty: 30 TABLET | Refills: 3 | Status: SHIPPED | OUTPATIENT
Start: 2025-07-18

## 2025-07-18 ASSESSMENT — PAIN SCALES - GENERAL: PAINLEVEL_OUTOF10: 0

## 2025-07-18 NOTE — PROGRESS NOTES
[facility] [place] [facility]  [facility]      RADIATION ONCOLOGY FOLLOW-UP VISIT NOTE     Encounter Date: 07/18/25   Patient Name: Cezar Henley  YOB: 1955  Medical Record Number: [id number]  Referring Physician: Dany Liao MD  [address] [place] [address]  Primary Care Provider: Taina Phelan MD      DIAGNOSIS:       ICD-10-CM    1. Malignant neoplasm of prostate (HCC)  C61         STAGING:    Cancer Staging   Malignant neoplasm of prostate (HCC)  Staging form: Prostate, AJCC 8th Edition  - Clinical stage from 4/8/2024: Stage IIIB (cT3b, cN0, cM0, PSA: 4.9, Grade Group: 3) - Signed by Erwin Nix MD on 5/31/2024  AJCC Staging has been reviewed      ASSESSMENT:   69 yo M with high risk prostate cancer, cT1c but rT3bN0, iPSA 4.88, [place] 4+3=7 in 1 core (4/12 total cores involved) treated with definitive IMRT (7020cGy, completed 8/8/24) + ADT.      PROSTATE CANCER: [place] 4+3=7 disease diagnosed with TRUS biopsy on 4/8/24, cT1c, iPSA 4.88.  Staging PSMA PET found uptake in the prostate and extending into the left SV, thus rT3b disease. No distant disease. Elected definitive therapy with radiation + ADT. ADT started with firmagon on 6/20/24 and then had 45mg lupron on 7/25/24.  IMRT completed to prostate/SV and pelvic LN on 8/8/24.  Initial post IMRT PSA 0.027 and currently trended down to undetectable and remains at <0.006 and thus PABLITO.  Ideally would have at least 18 months of ADT, although has had issues with side effects (fatigue, glycemic control, hot flashes) and so after discussion, he agrees to 13 months total, which is now complete (final 45mg lupron give 1/22/25).  Continue PSA surveillance q3 months.   SYMPTOMS: overall stable, although nocturia seems more of a bother.  On alfuzosin and vesicare.  Limiting evening fluid intake and no etoh/caffeine.  Recommend trial of oxybutynin as likely urgency playing a role.  GI